# Patient Record
Sex: FEMALE | Race: WHITE | NOT HISPANIC OR LATINO | Employment: FULL TIME | URBAN - METROPOLITAN AREA
[De-identification: names, ages, dates, MRNs, and addresses within clinical notes are randomized per-mention and may not be internally consistent; named-entity substitution may affect disease eponyms.]

---

## 2021-06-11 ENCOUNTER — COMMUNICATION - HEALTHEAST (OUTPATIENT)
Dept: ADMINISTRATIVE | Facility: HOSPITAL | Age: 51
End: 2021-06-11

## 2021-06-11 ENCOUNTER — OFFICE VISIT - HEALTHEAST (OUTPATIENT)
Dept: FAMILY MEDICINE | Facility: CLINIC | Age: 51
End: 2021-06-11

## 2021-06-11 DIAGNOSIS — J45.30 MILD PERSISTENT ASTHMA WITHOUT COMPLICATION: ICD-10-CM

## 2021-06-11 DIAGNOSIS — Z79.899 MEDICATION MANAGEMENT: ICD-10-CM

## 2021-06-11 DIAGNOSIS — Z00.00 ROUTINE GENERAL MEDICAL EXAMINATION AT A HEALTH CARE FACILITY: ICD-10-CM

## 2021-06-11 DIAGNOSIS — Z12.31 VISIT FOR SCREENING MAMMOGRAM: ICD-10-CM

## 2021-06-11 DIAGNOSIS — Z12.11 SCREEN FOR COLON CANCER: ICD-10-CM

## 2021-06-11 DIAGNOSIS — R60.9 EDEMA, UNSPECIFIED TYPE: ICD-10-CM

## 2021-06-11 DIAGNOSIS — T75.3XXA MOTION SICKNESS, INITIAL ENCOUNTER: ICD-10-CM

## 2021-06-11 DIAGNOSIS — Z82.49 FAMILY HISTORY OF DVT: ICD-10-CM

## 2021-06-11 DIAGNOSIS — Z11.59 ENCOUNTER FOR HEPATITIS C SCREENING TEST FOR LOW RISK PATIENT: ICD-10-CM

## 2021-06-11 DIAGNOSIS — E87.6 HYPOKALEMIA: ICD-10-CM

## 2021-06-11 DIAGNOSIS — L21.9 SEBORRHEIC DERMATITIS OF SCALP: ICD-10-CM

## 2021-06-11 DIAGNOSIS — Z13.220 LIPID SCREENING: ICD-10-CM

## 2021-06-11 DIAGNOSIS — M17.11 PRIMARY OSTEOARTHRITIS OF RIGHT KNEE: ICD-10-CM

## 2021-06-11 DIAGNOSIS — L30.9 ECZEMA, UNSPECIFIED TYPE: ICD-10-CM

## 2021-06-11 LAB
ALBUMIN SERPL-MCNC: 4.5 G/DL (ref 3.5–5)
ALBUMIN UR-MCNC: NEGATIVE G/DL
ALP SERPL-CCNC: 93 U/L (ref 45–120)
ALT SERPL W P-5'-P-CCNC: 19 U/L (ref 0–45)
ANION GAP SERPL CALCULATED.3IONS-SCNC: 14 MMOL/L (ref 5–18)
APPEARANCE UR: CLEAR
AST SERPL W P-5'-P-CCNC: 31 U/L (ref 0–40)
BILIRUB SERPL-MCNC: 1.1 MG/DL (ref 0–1)
BILIRUB UR QL STRIP: NEGATIVE
BUN SERPL-MCNC: 17 MG/DL (ref 8–22)
CALCIUM SERPL-MCNC: 9.9 MG/DL (ref 8.5–10.5)
CHLORIDE BLD-SCNC: 97 MMOL/L (ref 98–107)
CHOLEST SERPL-MCNC: 222 MG/DL
CO2 SERPL-SCNC: 29 MMOL/L (ref 22–31)
COLOR UR AUTO: YELLOW
CREAT SERPL-MCNC: 0.77 MG/DL (ref 0.6–1.1)
ERYTHROCYTE [DISTWIDTH] IN BLOOD BY AUTOMATED COUNT: 12.4 % (ref 11–14.5)
FASTING STATUS PATIENT QL REPORTED: YES
GFR SERPL CREATININE-BSD FRML MDRD: >60 ML/MIN/1.73M2
GLUCOSE BLD-MCNC: 99 MG/DL (ref 70–125)
GLUCOSE UR STRIP-MCNC: NEGATIVE MG/DL
HCT VFR BLD AUTO: 42.7 % (ref 35–47)
HDLC SERPL-MCNC: 61 MG/DL
HGB BLD-MCNC: 14.9 G/DL (ref 12–16)
HGB UR QL STRIP: NEGATIVE
KETONES UR STRIP-MCNC: NEGATIVE MG/DL
LDLC SERPL CALC-MCNC: 145 MG/DL
LEUKOCYTE ESTERASE UR QL STRIP: NEGATIVE
MCH RBC QN AUTO: 30.3 PG (ref 27–34)
MCHC RBC AUTO-ENTMCNC: 34.9 G/DL (ref 32–36)
MCV RBC AUTO: 87 FL (ref 80–100)
NITRATE UR QL: NEGATIVE
PH UR STRIP: 7 [PH] (ref 5–8)
PLATELET # BLD AUTO: 374 THOU/UL (ref 140–440)
PMV BLD AUTO: 9 FL (ref 7–10)
POTASSIUM BLD-SCNC: 4 MMOL/L (ref 3.5–5)
PROT SERPL-MCNC: 7.8 G/DL (ref 6–8)
RBC # BLD AUTO: 4.92 MILL/UL (ref 3.8–5.4)
SODIUM SERPL-SCNC: 140 MMOL/L (ref 136–145)
SP GR UR STRIP: 1.02 (ref 1–1.03)
TRIGL SERPL-MCNC: 82 MG/DL
UROBILINOGEN UR STRIP-ACNC: NORMAL
WBC: 6 THOU/UL (ref 4–11)

## 2021-06-11 RX ORDER — ALBUTEROL SULFATE 90 UG/1
2 AEROSOL, METERED RESPIRATORY (INHALATION) EVERY 4 HOURS PRN
Qty: 1 INHALER | Refills: 2 | Status: SHIPPED | OUTPATIENT
Start: 2021-06-11 | End: 2021-08-27

## 2021-06-11 RX ORDER — KETOCONAZOLE 20 MG/ML
SHAMPOO TOPICAL
Status: SHIPPED | COMMUNITY
Start: 2020-02-13 | End: 2024-03-15

## 2021-06-11 RX ORDER — TRIAMCINOLONE ACETONIDE 1 MG/G
OINTMENT TOPICAL
Status: SHIPPED | COMMUNITY
Start: 2020-11-17 | End: 2021-08-26

## 2021-06-11 RX ORDER — BUDESONIDE AND FORMOTEROL FUMARATE DIHYDRATE 80; 4.5 UG/1; UG/1
2 AEROSOL RESPIRATORY (INHALATION) 2 TIMES DAILY
Qty: 1 INHALER | Refills: 11 | Status: SHIPPED | OUTPATIENT
Start: 2021-06-11 | End: 2021-08-27

## 2021-06-11 RX ORDER — CELECOXIB 200 MG/1
200 CAPSULE ORAL DAILY PRN
Qty: 90 CAPSULE | Refills: 2 | Status: SHIPPED | OUTPATIENT
Start: 2021-06-11 | End: 2022-12-13

## 2021-06-11 RX ORDER — FLUOCINOLONE ACETONIDE 0.11 MG/ML
OIL TOPICAL
Status: SHIPPED | COMMUNITY
Start: 2021-02-07 | End: 2024-03-15

## 2021-06-11 RX ORDER — NAPROXEN 500 MG/1
500 TABLET ORAL 2 TIMES DAILY PRN
Status: SHIPPED | COMMUNITY
Start: 2021-02-07 | End: 2023-05-03

## 2021-06-11 RX ORDER — CLOBETASOL PROPIONATE 0.05 G/100ML
SHAMPOO TOPICAL
Status: SHIPPED | COMMUNITY
Start: 2020-11-16 | End: 2023-01-01

## 2021-06-11 RX ORDER — FLUOCINOLONE ACETONIDE 0.1 MG/ML
SOLUTION TOPICAL
Status: SHIPPED | COMMUNITY
Start: 2020-02-13 | End: 2023-01-01

## 2021-06-11 RX ORDER — CHLORTHALIDONE 25 MG/1
25 TABLET ORAL DAILY
Qty: 90 TABLET | Refills: 2 | Status: SHIPPED | OUTPATIENT
Start: 2021-06-11 | End: 2022-01-20

## 2021-06-11 ASSESSMENT — MIFFLIN-ST. JEOR: SCORE: 1415.24

## 2021-06-16 PROBLEM — T75.3XXA MOTION SICKNESS, INITIAL ENCOUNTER: Status: ACTIVE | Noted: 2021-06-11

## 2021-06-16 PROBLEM — M17.11 PRIMARY OSTEOARTHRITIS OF RIGHT KNEE: Status: ACTIVE | Noted: 2021-06-11

## 2021-06-16 PROBLEM — L21.9 SEBORRHEIC DERMATITIS OF SCALP: Status: ACTIVE | Noted: 2021-06-11

## 2021-06-16 PROBLEM — E87.6 HYPOKALEMIA: Status: ACTIVE | Noted: 2021-06-11

## 2021-06-16 PROBLEM — J45.30 MILD PERSISTENT ASTHMA WITHOUT COMPLICATION: Status: ACTIVE | Noted: 2021-06-11

## 2021-06-16 PROBLEM — L30.9 ECZEMA, UNSPECIFIED TYPE: Status: ACTIVE | Noted: 2021-06-11

## 2021-06-16 PROBLEM — R60.9 EDEMA, UNSPECIFIED TYPE: Status: ACTIVE | Noted: 2021-06-11

## 2021-06-16 LAB — HCV AB SERPL QL IA: NEGATIVE

## 2021-06-21 ENCOUNTER — COMMUNICATION - HEALTHEAST (OUTPATIENT)
Dept: ADMINISTRATIVE | Facility: HOSPITAL | Age: 51
End: 2021-06-21

## 2021-06-26 NOTE — PROGRESS NOTES
Assessment and Plan:    1. Routine general medical examination at a health care facility  Recommend consuming a healthy diet and exercising.  She has had HIV screening.  She declines Pap smear today.  She is up-to-date on vaccinations.  - HM2(CBC w/o Differential)  - Urinalysis-UC if Indicated    2. Encounter for hepatitis C screening test for low risk patient  - Hepatitis C Antibody (Anti-HCV)    3. Visit for screening mammogram  - Mammo Screening Bilateral; Future    4. Screen for colon cancer  - Cologuard    5. Family history of DVT  Patient has a significant family history of blood clots.  Will refer to hematology for evaluation and recommendations.  - Ambulatory referral to Oncology/Hematology Adult    6. Lipid screening  - Lipid Cascade    7. Motion sickness, initial encounter  She continues scopolamine patches as needed.    8. Primary osteoarthritis of right knee  Patient continues Celebrex.  Discussed importance of taking this with food.  Will check renal function.  - celecoxib (CELEBREX) 200 MG capsule; Take 1 capsule (200 mg total) by mouth daily as needed.  Dispense: 90 capsule; Refill: 2    9. Seborrheic dermatitis of scalp  She continues fluocinolone, clobetasol, ketoconazole as needed.    10. Eczema, unspecified type  She continues triamcinolone cream as needed.    11. Mild persistent asthma without complication  Obtained ACT score of 21.  She continues Symbicort daily and albuterol as needed.  - budesonide-formoteroL (SYMBICORT) 80-4.5 mcg/actuation inhaler; Inhale 2 puffs 2 (two) times a day.  Dispense: 1 Inhaler; Refill: 11  - albuterol (VENTOLIN HFA) 90 mcg/actuation inhaler; Inhale 2 puffs every 4 (four) hours as needed for wheezing.  Dispense: 1 Inhaler; Refill: 2    12. Edema, unspecified type  This is controlled with chlorthalidone.  Will check renal function and electrolytes.  - chlorthalidone (HYGROTEN) 25 MG tablet; Take 1 tablet (25 mg total) by mouth daily.  Dispense: 90 tablet; Refill:  2    13. Hypokalemia  She has a history of hypokalemia.  Will check potassium.  - potassium chloride (KLOR-CON) 10 MEQ CR tablet; Take 1 tablet (10 mEq total) by mouth daily.  Dispense: 90 tablet; Refill: 2    14. Medication management  - Comprehensive Metabolic Panel      Subjective:     Neisha is a 50 y.o. female presenting to the clinic for a female physical.     LMP: 5-6 years ago  Hx of abnormal pap smear: 2017 low grade TERRIE 1   Last pap smear: 10/3/18 normal, negative HPV   Perform self-breast exams: yes   Vaginal discharge or irritation: none   Sexually active: yes,  for 27 years   Contraception: none   Concerns for STDs: none   Previous pregnancies:two pregnancies (vaginal deliveries)  Daughter 25 and Son 22     Patient seborrheic dermatitis and uses fluocinolone, clobetasol, and ketoconazole shampoos.  Patient has mild persistent asthma and uses Symbicort daily and albuterol as needed.  Exercise and allergy exposure exacerbate her asthma.  She has been using her Ventolin inhaler twice weekly.  Patient uses scopolamine patches as needed for motion sickness with travel and amusement park rides.  She uses chlorthalidone daily for lower extremity edema.  She has a history of hypokalemia and takes 10 mEq of potassium daily.  Patient has had numerous surgeries to her right knee and is now told that she has significant osteoarthritis.  She alternates Celebrex with naproxen.  She uses triamcinolone cream as needed for eczema patches on her skin.  Lastly, patient has a significant family history of blood clots.  Both her father and paternal grandfather taking anticoagulation.  She believes they have tested negative for clotting diseases.    Review of systems:  I performed a 10 point review of systems.  All pertinent positives and negatives are noted in the HPI. All others are negative.     Allergies   Allergen Reactions     Shellfish Derived Wheezing     All shellfish     Latex Rash       Current  Outpatient Medications on File Prior to Visit   Medication Sig Dispense Refill     clobetasoL 0.05 % shampoo USE ON SCALP OCCASIONALLY PRN.       fluocinolone (DERMA-SMOOTHE) 0.01 % external oil APPLY 5 ML EXTERNALLY TO THE AFFECTED AREA 1 TIME A WEEK       fluocinolone (SYNALAR) 0.01 % external solution Apply to scalp as directed       ketoconazole (NIZORAL) 2 % shampoo Apply topically.       naproxen (NAPROSYN) 500 MG tablet Take 500 mg by mouth 2 (two) times a day as needed.       scopolamine (TRANSDERM-SCOP) 1.5 mg transdermal patch Place 1 patch on the skin.       triamcinolone (KENALOG) 0.1 % ointment Apply topically.       [DISCONTINUED] budesonide-formoteroL (SYMBICORT) 80-4.5 mcg/actuation inhaler Inhale 2 puffs.       [DISCONTINUED] celecoxib (CELEBREX) 200 MG capsule Take 200 mg by mouth daily as needed.       [DISCONTINUED] chlorthalidone (HYGROTEN) 25 MG tablet Take 25 mg by mouth daily.       [DISCONTINUED] potassium chloride (KLOR-CON) 10 MEQ CR tablet Take 20 mEq by mouth daily.       [DISCONTINUED] VENTOLIN HFA 90 mcg/actuation inhaler 2 puffs every 6 (six) hours as needed.       No current facility-administered medications on file prior to visit.        Social History     Socioeconomic History     Marital status:      Spouse name: Not on file     Number of children: Not on file     Years of education: Not on file     Highest education level: Not on file   Occupational History     Not on file   Social Needs     Financial resource strain: Not on file     Food insecurity     Worry: Not on file     Inability: Not on file     Transportation needs     Medical: Not on file     Non-medical: Not on file   Tobacco Use     Smoking status: Never Smoker     Smokeless tobacco: Never Used   Substance and Sexual Activity     Alcohol use: Not Currently     Drug use: Never     Sexual activity: Not on file     Comment:     Lifestyle     Physical activity     Days per week: Not on file     Minutes per  session: Not on file     Stress: Not on file   Relationships     Social connections     Talks on phone: Not on file     Gets together: Not on file     Attends Tenriism service: Not on file     Active member of club or organization: Not on file     Attends meetings of clubs or organizations: Not on file     Relationship status: Not on file     Intimate partner violence     Fear of current or ex partner: Not on file     Emotionally abused: Not on file     Physically abused: Not on file     Forced sexual activity: Not on file   Other Topics Concern     Not on file   Social History Narrative     Not on file       Past Medical History:   Diagnosis Date     Asthma        Family History   Problem Relation Age of Onset     Osteopenia Mother      Hyperlipidemia Father      Deep vein thrombosis Father      Diabetes Father        Past Surgical History:   Procedure Laterality Date     KNEE ARTHROSCOPY Right      KNEE CARTILAGE SURGERY Right        Objective:     Vitals:    06/11/21 0905   BP: 116/80   Pulse: 73   SpO2: 98%       Patient is alert, no obvious distress.   Skin: Warm, dry.  No rashes or lesions. Skin turgor rapid return.   HEENT:  Eyes normal.  Ears normal.  Nose patent, mucosa pink.  Oropharynx mucosa pink, no lesions or tonsil enlargement.   Neck:  Supple, without lymphadenopathy, bruits, JVD. Thyroid normal texture and size.    Lungs:  Clear to auscultation.  No wheezing, rales noted.  Respirations even and unlabored.   Heart:  Regular rate and rhythm.  No murmurs.   Breasts:  Normal.  No surrounding adenopathy.   Abdomen: Soft, nontender.  No organomegaly.  Bowel sounds normoactive.  No guarding or masses noted.   :  Deferred per patient   Musculoskeletal:  Full ROM of extremities.    Neurological: No obvious gross or fine motor deficit is present.

## 2021-06-27 ENCOUNTER — HEALTH MAINTENANCE LETTER (OUTPATIENT)
Age: 51
End: 2021-06-27

## 2021-07-04 NOTE — LETTER
Letter by Tammy San CNP at      Author: Tammy San CNP Service: -- Author Type: --    Filed:  Encounter Date: 6/21/2021 Status: (Other)                   Office Hours: Monday - Friday 8:00 - 4:30PM    Neisha Morton  [address]  [facility] [address]           June 21, 2021      Dear Neisha:    Tammy San CNP had placed a referral for you to meet with hematology. Unfortunately we have been unable to reach you by phone. If you would like to schedule this please call [phone]         Sincerely,        [facility]

## 2021-07-06 VITALS
SYSTOLIC BLOOD PRESSURE: 116 MMHG | BODY MASS INDEX: 26.53 KG/M2 | HEIGHT: 67 IN | WEIGHT: 169 LBS | DIASTOLIC BLOOD PRESSURE: 80 MMHG | OXYGEN SATURATION: 98 % | HEART RATE: 73 BPM

## 2021-07-08 ASSESSMENT — ASTHMA QUESTIONNAIRES: ACT_TOTALSCORE: 21

## 2021-08-27 ENCOUNTER — OFFICE VISIT (OUTPATIENT)
Dept: FAMILY MEDICINE | Facility: CLINIC | Age: 51
End: 2021-08-27
Payer: COMMERCIAL

## 2021-08-27 VITALS
HEART RATE: 68 BPM | SYSTOLIC BLOOD PRESSURE: 102 MMHG | BODY MASS INDEX: 25.78 KG/M2 | OXYGEN SATURATION: 98 % | WEIGHT: 163.4 LBS | DIASTOLIC BLOOD PRESSURE: 58 MMHG

## 2021-08-27 DIAGNOSIS — H53.149 PHOTOPHOBIA: ICD-10-CM

## 2021-08-27 DIAGNOSIS — H57.89 RED EYE: Primary | ICD-10-CM

## 2021-08-27 PROCEDURE — 99213 OFFICE O/P EST LOW 20 MIN: CPT | Performed by: NURSE PRACTITIONER

## 2021-08-27 RX ORDER — POTASSIUM CHLORIDE 750 MG/1
10 TABLET, EXTENDED RELEASE ORAL
COMMUNITY
Start: 2021-06-11 | End: 2021-08-27

## 2021-08-27 RX ORDER — BUDESONIDE AND FORMOTEROL FUMARATE DIHYDRATE 80; 4.5 UG/1; UG/1
2 AEROSOL RESPIRATORY (INHALATION)
COMMUNITY
Start: 2020-02-13 | End: 2022-12-13

## 2021-08-27 RX ORDER — CELECOXIB 200 MG/1
200 CAPSULE ORAL
COMMUNITY
Start: 2020-02-13 | End: 2021-08-27

## 2021-08-27 RX ORDER — KETOCONAZOLE 20 MG/ML
SHAMPOO TOPICAL
COMMUNITY
Start: 2020-02-13 | End: 2021-08-27

## 2021-08-27 RX ORDER — CHLORTHALIDONE 25 MG/1
TABLET ORAL
COMMUNITY
Start: 2020-04-15 | End: 2021-08-27

## 2021-08-27 RX ORDER — TRIAMCINOLONE ACETONIDE 1 MG/G
OINTMENT TOPICAL
COMMUNITY
Start: 2020-11-17 | End: 2021-08-27

## 2021-08-27 RX ORDER — EPINEPHRINE 0.3 MG/.3ML
INJECTION SUBCUTANEOUS
COMMUNITY
Start: 2021-06-03 | End: 2022-04-28

## 2021-08-27 RX ORDER — ALBUTEROL SULFATE 90 UG/1
2 AEROSOL, METERED RESPIRATORY (INHALATION)
COMMUNITY
Start: 2020-08-28 | End: 2021-12-25

## 2021-08-27 RX ORDER — FLUOCINOLONE ACETONIDE 0.11 MG/ML
5 OIL TOPICAL
COMMUNITY
Start: 2020-02-13 | End: 2022-12-13

## 2021-08-27 RX ORDER — SCOLOPAMINE TRANSDERMAL SYSTEM 1 MG/1
1 PATCH, EXTENDED RELEASE TRANSDERMAL
COMMUNITY
Start: 2020-02-13 | End: 2021-08-27

## 2021-08-27 RX ORDER — NAPROXEN 500 MG/1
500 TABLET ORAL
COMMUNITY
Start: 2020-12-03 | End: 2021-08-27

## 2021-08-27 RX ORDER — FLUOCINOLONE ACETONIDE 0.1 MG/ML
SOLUTION TOPICAL
COMMUNITY
Start: 2020-02-13 | End: 2021-08-27

## 2021-08-27 NOTE — PROGRESS NOTES
Assessment and Plan:       ICD-10-CM    1. Red eye  H57.89 Adult Eye Referral   2. Photophobia  H53.149 Adult Eye Referral     Patient has a red eye and is experiencing photophobia and discomfort. Concerns include iritis, uveitis, glaucoma. She continues lubricating eyedrops. Discussed over-the-counter acetaminophen as needed for pain. Will refer to ophthalmology for a visit today.  She is content with the plan.     Subjective:     Neisha is a 50 year old female presenting to the clinic for concerns for right eye pain. Patient states Wednesday, she developed a burning sensation within her right eye. Since then, she has been experiencing photophobia. The eye is sensitive to a light breeze. She denies foreign body sensation and decreased visual acuity. She has tried lubricating eyedrops with no relief. She denies any trauma to the eye. She does wear contact lens. She has not had any recent cold symptoms or fever. She denies recent Covid exposure.    Reviewof Systems: A complete 14 point review of systems was obtained and is negative or as stated in the history of present illness.    Social History     Socioeconomic History     Marital status:      Spouse name: Not on file     Number of children: Not on file     Years of education: Not on file     Highest education level: Not on file   Occupational History     Not on file   Tobacco Use     Smoking status: Never Smoker     Smokeless tobacco: Never Used   Substance and Sexual Activity     Alcohol use: Not Currently     Drug use: Never     Sexual activity: Not on file     Comment:     Other Topics Concern     Not on file   Social History Narrative     Not on file     Social Determinants of Health     Financial Resource Strain:      Difficulty of Paying Living Expenses:    Food Insecurity:      Worried About Running Out of Food in the Last Year:      Ran Out of Food in the Last Year:    Transportation Needs:      Lack of Transportation (Medical):      Lack  of Transportation (Non-Medical):    Physical Activity:      Days of Exercise per Week:      Minutes of Exercise per Session:    Stress:      Feeling of Stress :    Social Connections:      Frequency of Communication with Friends and Family:      Frequency of Social Gatherings with Friends and Family:      Attends Alevism Services:      Active Member of Clubs or Organizations:      Attends Club or Organization Meetings:      Marital Status:    Intimate Partner Violence:      Fear of Current or Ex-Partner:      Emotionally Abused:      Physically Abused:      Sexually Abused:        Active Ambulatory Problems     Diagnosis Date Noted     Primary osteoarthritis of right knee 06/11/2021     Motion sickness, initial encounter 06/11/2021     Seborrheic dermatitis of scalp 06/11/2021     Eczema, unspecified type 06/11/2021     Mild persistent asthma without complication 06/11/2021     Edema, unspecified type 06/11/2021     Hypokalemia 06/11/2021     Resolved Ambulatory Problems     Diagnosis Date Noted     No Resolved Ambulatory Problems     Past Medical History:   Diagnosis Date     Asthma        Family History   Problem Relation Age of Onset     Osteopenia Mother      Hyperlipidemia Father      Deep Vein Thrombosis Father      Diabetes Father        Objective:     /58 (BP Location: Left arm, Patient Position: Sitting, Cuff Size: Adult Regular)   Pulse 68   Wt 74.1 kg (163 lb 6.4 oz)   SpO2 98%   BMI 25.78 kg/m      Patient is alert, in no obvious distress.   Skin: Warm, dry.  No lesions or rashes.  Skin turgor rapid return.   HEENT:  Head normocephalic, atraumatic.  Right eye conjunctiva is injected.  Left eye is normal.  PERRL.  EOM's intact.  No nystagmus. She complains of photophobia and does not tolerate the exam well. Ears normal.  Nose patent, mucosa pink.  Oropharynx mucosa pink.  No lesions or tonsillar enlargement.   Neck: Supple, no lymphadenopathy.   Lungs:  Clear to auscultation. Respirations even  and unlabored.  No wheezing or rales noted.   Heart:  Regular rate and rhythm.  No murmurs.

## 2021-10-17 ENCOUNTER — HEALTH MAINTENANCE LETTER (OUTPATIENT)
Age: 51
End: 2021-10-17

## 2021-12-22 DIAGNOSIS — J45.30 MILD PERSISTENT ASTHMA WITHOUT COMPLICATION: Primary | ICD-10-CM

## 2021-12-25 RX ORDER — ALBUTEROL SULFATE 90 UG/1
AEROSOL, METERED RESPIRATORY (INHALATION)
Qty: 17 G | Refills: 1 | Status: SHIPPED | OUTPATIENT
Start: 2021-12-25 | End: 2022-05-20

## 2021-12-25 NOTE — TELEPHONE ENCOUNTER
"Routing refill request to provider for review/approval because:  Failed asthma protocol    Last Written Prescription Date:  6/11/2021  Last Fill Quantity: 1 inhaler,  # refills: 2   Last office visit provider:  8/27/2021 Tammy San CNP       albuterol (VENTOLIN HFA) 90 mcg/actuation inhaler 1 Inhaler 2 6/11/2021  No   Sig - Route: Inhale 2 puffs every 4 (four) hours as needed for wheezing. - Inhalation   Sent to pharmacy as: albuterol sulfate HFA 90 mcg/actuation aerosol inhaler (Ventolin HFA)   Notes to Pharmacy: May substitute the equivalent medication per insurance preference.   E-Prescribing Status: Receipt confirmed by pharmacy (6/11/2021  9:53 AM CDT         Requested Prescriptions   Pending Prescriptions Disp Refills     albuterol (PROAIR HFA/PROVENTIL HFA/VENTOLIN HFA) 108 (90 Base) MCG/ACT inhaler [Pharmacy Med Name: ALBUTEROL HFA INH (200 PUFFS)8.5GM] 17 g      Sig: INHALE 2 PUFFS BY MOUTH EVERY 4 HOURS AS NEEDED FOR WHEEZING       Asthma Maintenance Inhalers - Anticholinergics Failed - 12/22/2021 12:33 PM        Failed - Asthma control assessment score within normal limits in last 6 months     Please review ACT score.           Passed - Patient is age 12 years or older        Passed - Medication is active on med list        Passed - Recent (6 mo) or future (30 days) visit within the authorizing provider's specialty     Patient had office visit in the last 6 months or has a visit in the next 30 days with authorizing provider or within the authorizing provider's specialty.  See \"Patient Info\" tab in inbasket, or \"Choose Columns\" in Meds & Orders section of the refill encounter.           Short-Acting Beta Agonist Inhalers Protocol  Failed - 12/22/2021 12:33 PM        Failed - Asthma control assessment score within normal limits in last 6 months     Please review ACT score.           Passed - Patient is age 12 or older        Passed - Medication is active on med list        Passed - Recent (6 mo) or future " "(30 days) visit within the authorizing provider's specialty     Patient had office visit in the last 6 months or has a visit in the next 30 days with authorizing provider or within the authorizing provider's specialty.  See \"Patient Info\" tab in inbasket, or \"Choose Columns\" in Meds & Orders section of the refill encounter.                 Dalia Angela RN 12/24/21 11:19 PM  "

## 2021-12-26 DIAGNOSIS — J45.30 MILD PERSISTENT ASTHMA WITHOUT COMPLICATION: ICD-10-CM

## 2021-12-28 RX ORDER — ALBUTEROL SULFATE 90 UG/1
AEROSOL, METERED RESPIRATORY (INHALATION)
Qty: 42.5 G | OUTPATIENT
Start: 2021-12-28

## 2022-01-15 ENCOUNTER — OFFICE VISIT (OUTPATIENT)
Dept: URGENT CARE | Facility: URGENT CARE | Age: 52
End: 2022-01-15
Payer: COMMERCIAL

## 2022-01-15 VITALS
WEIGHT: 163 LBS | TEMPERATURE: 98.1 F | BODY MASS INDEX: 25.72 KG/M2 | HEART RATE: 89 BPM | DIASTOLIC BLOOD PRESSURE: 75 MMHG | SYSTOLIC BLOOD PRESSURE: 113 MMHG | OXYGEN SATURATION: 99 %

## 2022-01-15 DIAGNOSIS — J01.10 ACUTE NON-RECURRENT FRONTAL SINUSITIS: Primary | ICD-10-CM

## 2022-01-15 PROCEDURE — 99213 OFFICE O/P EST LOW 20 MIN: CPT | Performed by: PHYSICIAN ASSISTANT

## 2022-01-15 NOTE — PROGRESS NOTES
Assessment & Plan     1. Acute non-recurrent frontal sinusitis    - amoxicillin-clavulanate (AUGMENTIN) 875-125 MG tablet; Take 1 tablet by mouth 2 times daily for 7 days  Dispense: 14 tablet; Refill: 0    Decongestants, steroid nasal spray, and augmentin. Follow up with PCP clinic if not improving over the next week.                 [facility] [place]    Sanju Driver is a 51 year old female who presents to clinic today for the following health issues:  Chief Complaint   Patient presents with     Urgent Care     Sinus Problem     c/o sinus infection for 5 days     HPI    2 weeks of infection including cough. Then over 5 days has now had frontal pain on forehead. No fever. Minimal nasal congestion. Some fatigue with dad passing a few days ago. Flonase, decongestants and mucinex without any change in pain or symptoms. COVID negative initially.         Review of Systems        Objective    /75   Pulse 89   Temp 98.1  F (36.7  C) (Tympanic)   Wt 73.9 kg (163 lb)   SpO2 99%   BMI 25.72 kg/m    Physical Exam  Vitals and nursing note reviewed.   Constitutional:       General: She is not in acute distress.     Appearance: She is well-developed. She is not diaphoretic.   HENT:      Head: Normocephalic and atraumatic.        Right Ear: Tympanic membrane and external ear normal.      Left Ear: Tympanic membrane and external ear normal.   Eyes:      Pupils: Pupils are equal, round, and reactive to light.   Pulmonary:      Effort: Pulmonary effort is normal. No respiratory distress.      Breath sounds: Normal breath sounds.   Musculoskeletal:      Cervical back: Normal range of motion and neck supple.   Lymphadenopathy:      Cervical: No cervical adenopathy.   Skin:     General: Skin is warm and dry.   Neurological:      Mental Status: She is alert.      Cranial Nerves: No cranial nerve deficit.

## 2022-01-18 DIAGNOSIS — R60.9 EDEMA, UNSPECIFIED TYPE: ICD-10-CM

## 2022-01-20 RX ORDER — CHLORTHALIDONE 25 MG/1
TABLET ORAL
Qty: 90 TABLET | Refills: 2 | Status: SHIPPED | OUTPATIENT
Start: 2022-01-20 | End: 2022-08-07

## 2022-01-20 NOTE — TELEPHONE ENCOUNTER
"Routing refill request to provider for review/approval because:  Early refill requested.    Last Written Prescription Date:  6/11/21  Last Fill Quantity: 90,  # refills: 2   Last office visit provider:  8/27/21     Requested Prescriptions   Pending Prescriptions Disp Refills     chlorthalidone (HYGROTON) 25 MG tablet [Pharmacy Med Name: CHLORTHALIDONE 25MG TABLETS] 90 tablet 2     Sig: TAKE 1 TABLET(25 MG) BY MOUTH DAILY       Diuretics (Including Combos) Protocol Passed - 1/18/2022  1:55 PM        Passed - Blood pressure under 140/90 in past 12 months     BP Readings from Last 3 Encounters:   01/15/22 113/75   08/27/21 102/58   06/11/21 116/80                 Passed - Recent (12 mo) or future (30 days) visit within the authorizing provider's specialty     Patient has had an office visit with the authorizing provider or a provider within the authorizing providers department within the previous 12 mos or has a future within next 30 days. See \"Patient Info\" tab in inbasket, or \"Choose Columns\" in Meds & Orders section of the refill encounter.              Passed - Medication is active on med list        Passed - Patient is age 18 or older        Passed - No active pregancy on record        Passed - Normal serum creatinine on file in past 12 months     Recent Labs   Lab Test 06/11/21  1014   CR 0.77              Passed - Normal serum potassium on file in past 12 months     Recent Labs   Lab Test 06/11/21  1014   POTASSIUM 4.0                    Passed - Normal serum sodium on file in past 12 months     Recent Labs   Lab Test 06/11/21  1014                 Passed - No positive pregnancy test in past 12 months             Jerod Norton RN 01/20/22 1:48 PM  "

## 2022-02-24 DIAGNOSIS — L30.9 ECZEMA, UNSPECIFIED TYPE: ICD-10-CM

## 2022-02-26 NOTE — TELEPHONE ENCOUNTER
"Routing refill request to provider for review/approval because:  PCP to review    Last Written Prescription Date:  8/26/2021  Last Fill Quantity: 80 g,  # refills: 0   Last office visit provider:  8/27/2021 Tammy San CNP     Requested Prescriptions   Pending Prescriptions Disp Refills     triamcinolone (KENALOG) 0.1 % external ointment [Pharmacy Med Name: TRIAMCINOLONE 0.1% OINTMENT 80GM] 80 g 0     Sig: APPLY TOPICALLY TO THE AFFECTED AREA TWICE DAILY       Topical Steroids and Nonsteroidals Protocol Passed - 2/24/2022  4:58 PM        Passed - Patient is age 6 or older        Passed - Authorizing prescriber's most recent note related to this medication read.     If refill request is for ophthalmic use, please forward request to provider for approval.          Passed - High potency steroid not ordered        Passed - Recent (12 mo) or future (30 days) visit within the authorizing provider's specialty     Patient has had an office visit with the authorizing provider or a provider within the authorizing providers department within the previous 12 mos or has a future within next 30 days. See \"Patient Info\" tab in inbasket, or \"Choose Columns\" in Meds & Orders section of the refill encounter.              Passed - Medication is active on med list             Dalia Angela RN 02/26/22 3:28 PM  "

## 2022-02-27 RX ORDER — TRIAMCINOLONE ACETONIDE 1 MG/G
OINTMENT TOPICAL
Qty: 80 G | Refills: 0 | Status: SHIPPED | OUTPATIENT
Start: 2022-02-27 | End: 2022-12-21

## 2022-03-02 ENCOUNTER — ANCILLARY PROCEDURE (OUTPATIENT)
Dept: MAMMOGRAPHY | Facility: CLINIC | Age: 52
End: 2022-03-02
Attending: NURSE PRACTITIONER
Payer: COMMERCIAL

## 2022-03-02 DIAGNOSIS — Z12.31 VISIT FOR SCREENING MAMMOGRAM: ICD-10-CM

## 2022-03-02 PROCEDURE — 77067 SCR MAMMO BI INCL CAD: CPT

## 2022-05-16 DIAGNOSIS — J45.30 MILD PERSISTENT ASTHMA WITHOUT COMPLICATION: ICD-10-CM

## 2022-05-17 NOTE — TELEPHONE ENCOUNTER
"Routing refill request to provider for review/approval because:  act    Last Written Prescription Date:  12/25/21  Last Fill Quantity: 17,  # refills: 1   Last office visit provider:  8/27/21     Requested Prescriptions   Pending Prescriptions Disp Refills     albuterol (PROAIR HFA/PROVENTIL HFA/VENTOLIN HFA) 108 (90 Base) MCG/ACT inhaler [Pharmacy Med Name: ALBUTEROL HFA INH (200 PUFFS)8.5GM] 42.5 g      Sig: INHALE 2 PUFFS BY MOUTH EVERY 4 HOURS AS NEEDED FOR WHEEZING       Asthma Maintenance Inhalers - Anticholinergics Failed - 5/16/2022 11:17 AM        Failed - Asthma control assessment score within normal limits in last 6 months     Please review ACT score.           Failed - Recent (6 mo) or future (30 days) visit within the authorizing provider's specialty     Patient had office visit in the last 6 months or has a visit in the next 30 days with authorizing provider or within the authorizing provider's specialty.  See \"Patient Info\" tab in inbasket, or \"Choose Columns\" in Meds & Orders section of the refill encounter.            Passed - Patient is age 12 years or older        Passed - Medication is active on med list       Short-Acting Beta Agonist Inhalers Protocol  Failed - 5/16/2022 11:17 AM        Failed - Asthma control assessment score within normal limits in last 6 months     Please review ACT score.           Failed - Recent (6 mo) or future (30 days) visit within the authorizing provider's specialty     Patient had office visit in the last 6 months or has a visit in the next 30 days with authorizing provider or within the authorizing provider's specialty.  See \"Patient Info\" tab in inbasket, or \"Choose Columns\" in Meds & Orders section of the refill encounter.            Passed - Patient is age 12 or older        Passed - Medication is active on med list             Mercer, Renée, RN 05/17/22 4:40 PM  "

## 2022-05-20 RX ORDER — ALBUTEROL SULFATE 90 UG/1
AEROSOL, METERED RESPIRATORY (INHALATION)
Qty: 42.5 G | Refills: 0 | Status: SHIPPED | OUTPATIENT
Start: 2022-05-20 | End: 2022-12-13

## 2022-07-24 ENCOUNTER — HEALTH MAINTENANCE LETTER (OUTPATIENT)
Age: 52
End: 2022-07-24

## 2022-08-01 DIAGNOSIS — R60.9 EDEMA, UNSPECIFIED TYPE: ICD-10-CM

## 2022-08-01 RX ORDER — CHLORTHALIDONE 25 MG/1
TABLET ORAL
Qty: 90 TABLET | Refills: 2 | Status: CANCELLED | OUTPATIENT
Start: 2022-08-01

## 2022-08-02 NOTE — TELEPHONE ENCOUNTER
"Routing refill request to provider for review/approval because:  Patient needs to be seen because it has been more than 1 year since last office visit.    Last Written Prescription Date:  1/20/22   Last Fill Quantity: 90,  # refills: 2   Last office visit provider:  8/27/21     Requested Prescriptions   Pending Prescriptions Disp Refills     chlorthalidone (HYGROTON) 25 MG tablet [Pharmacy Med Name: CHLORTHALIDONE 25MG TABLETS] 90 tablet 2     Sig: TAKE 1 TABLET(25 MG) BY MOUTH DAILY       Diuretics (Including Combos) Protocol Failed - 8/1/2022  4:52 PM        Failed - Normal serum creatinine on file in past 12 months     Recent Labs   Lab Test 06/11/21  1014   CR 0.77              Failed - Normal serum potassium on file in past 12 months     Recent Labs   Lab Test 06/11/21  1014   POTASSIUM 4.0                    Failed - Normal serum sodium on file in past 12 months     Recent Labs   Lab Test 06/11/21  1014                 Passed - Blood pressure under 140/90 in past 12 months     BP Readings from Last 3 Encounters:   01/15/22 113/75   08/27/21 102/58   06/11/21 116/80                 Passed - Recent (12 mo) or future (30 days) visit within the authorizing provider's specialty     Patient has had an office visit with the authorizing provider or a provider within the authorizing providers department within the previous 12 mos or has a future within next 30 days. See \"Patient Info\" tab in inbasket, or \"Choose Columns\" in Meds & Orders section of the refill encounter.              Passed - Medication is active on med list        Passed - Patient is age 18 or older        Passed - No active pregancy on record        Passed - No positive pregnancy test in past 12 months             Renée Hernandez RN 08/02/22 1:54 PM  "

## 2022-08-07 RX ORDER — CHLORTHALIDONE 25 MG/1
TABLET ORAL
Qty: 30 TABLET | Refills: 0 | Status: SHIPPED | OUTPATIENT
Start: 2022-08-07 | End: 2022-10-19

## 2022-10-03 ENCOUNTER — HEALTH MAINTENANCE LETTER (OUTPATIENT)
Age: 52
End: 2022-10-03

## 2022-10-18 DIAGNOSIS — R60.9 EDEMA, UNSPECIFIED TYPE: ICD-10-CM

## 2022-10-19 RX ORDER — CHLORTHALIDONE 25 MG/1
25 TABLET ORAL DAILY
Qty: 90 TABLET | Refills: 0 | Status: SHIPPED | OUTPATIENT
Start: 2022-10-19 | End: 2022-12-13

## 2022-10-19 NOTE — TELEPHONE ENCOUNTER
"Routing refill request to provider for review/approval because:  Labs not current:  Multiple  Patient needs to be seen because it has been more than 1 year since last office visit.    Last Written Prescription Date:  8/7/22  Last Fill Quantity: 30,  # refills: 0   Last office visit provider:  8/27/21 - scheduled 11/1/22     Requested Prescriptions   Pending Prescriptions Disp Refills     chlorthalidone (HYGROTON) 25 MG tablet [Pharmacy Med Name: CHLORTHALIDONE 25MG TABLETS] 90 tablet      Sig: TAKE 1 TABLET(25 MG) BY MOUTH DAILY       Diuretics (Including Combos) Protocol Failed - 10/19/2022  8:52 AM        Failed - Normal serum creatinine on file in past 12 months     Recent Labs   Lab Test 06/11/21  1014   CR 0.77              Failed - Normal serum potassium on file in past 12 months     Recent Labs   Lab Test 06/11/21  1014   POTASSIUM 4.0                    Failed - Normal serum sodium on file in past 12 months     Recent Labs   Lab Test 06/11/21  1014                 Passed - Blood pressure under 140/90 in past 12 months     BP Readings from Last 3 Encounters:   01/15/22 113/75   08/27/21 102/58   06/11/21 116/80                 Passed - Recent (12 mo) or future (30 days) visit within the authorizing provider's specialty     Patient has had an office visit with the authorizing provider or a provider within the authorizing providers department within the previous 12 mos or has a future within next 30 days. See \"Patient Info\" tab in inbasket, or \"Choose Columns\" in Meds & Orders section of the refill encounter.              Passed - Medication is active on med list        Passed - Patient is age 18 or older        Passed - No active pregancy on record        Passed - No positive pregnancy test in past 12 months             Jerod Norton RN 10/19/22 8:52 AM  "

## 2022-12-13 ENCOUNTER — OFFICE VISIT (OUTPATIENT)
Dept: FAMILY MEDICINE | Facility: CLINIC | Age: 52
End: 2022-12-13
Payer: COMMERCIAL

## 2022-12-13 VITALS
OXYGEN SATURATION: 98 % | TEMPERATURE: 98.1 F | SYSTOLIC BLOOD PRESSURE: 98 MMHG | HEIGHT: 67 IN | BODY MASS INDEX: 28.25 KG/M2 | DIASTOLIC BLOOD PRESSURE: 82 MMHG | HEART RATE: 74 BPM | WEIGHT: 180 LBS

## 2022-12-13 DIAGNOSIS — Z00.00 ENCOUNTER FOR ROUTINE HISTORY AND PHYSICAL EXAM IN FEMALE: Primary | ICD-10-CM

## 2022-12-13 DIAGNOSIS — R60.9 EDEMA, UNSPECIFIED TYPE: ICD-10-CM

## 2022-12-13 DIAGNOSIS — M17.11 PRIMARY OSTEOARTHRITIS OF RIGHT KNEE: ICD-10-CM

## 2022-12-13 DIAGNOSIS — J45.30 MILD PERSISTENT ASTHMA WITHOUT COMPLICATION: ICD-10-CM

## 2022-12-13 DIAGNOSIS — Z12.11 SCREEN FOR COLON CANCER: ICD-10-CM

## 2022-12-13 DIAGNOSIS — Z13.1 DIABETES MELLITUS SCREENING: ICD-10-CM

## 2022-12-13 DIAGNOSIS — Z12.4 CERVICAL CANCER SCREENING: ICD-10-CM

## 2022-12-13 DIAGNOSIS — Z13.220 LIPID SCREENING: ICD-10-CM

## 2022-12-13 DIAGNOSIS — Z79.899 MEDICATION MANAGEMENT: ICD-10-CM

## 2022-12-13 LAB
ALBUMIN SERPL BCG-MCNC: 4.7 G/DL (ref 3.5–5.2)
ALBUMIN UR-MCNC: NEGATIVE MG/DL
ALP SERPL-CCNC: 94 U/L (ref 35–104)
ALT SERPL W P-5'-P-CCNC: 21 U/L (ref 10–35)
ANION GAP SERPL CALCULATED.3IONS-SCNC: 16 MMOL/L (ref 7–15)
APPEARANCE UR: CLEAR
AST SERPL W P-5'-P-CCNC: 32 U/L (ref 10–35)
BILIRUB SERPL-MCNC: 0.6 MG/DL
BILIRUB UR QL STRIP: NEGATIVE
BUN SERPL-MCNC: 18.5 MG/DL (ref 6–20)
CALCIUM SERPL-MCNC: 9.7 MG/DL (ref 8.6–10)
CHLORIDE SERPL-SCNC: 96 MMOL/L (ref 98–107)
CHOLEST SERPL-MCNC: 226 MG/DL
COLOR UR AUTO: YELLOW
CREAT SERPL-MCNC: 0.69 MG/DL (ref 0.51–0.95)
DEPRECATED HCO3 PLAS-SCNC: 27 MMOL/L (ref 22–29)
ERYTHROCYTE [DISTWIDTH] IN BLOOD BY AUTOMATED COUNT: 12.1 % (ref 10–15)
GFR SERPL CREATININE-BSD FRML MDRD: >90 ML/MIN/1.73M2
GLUCOSE SERPL-MCNC: 105 MG/DL (ref 70–99)
GLUCOSE UR STRIP-MCNC: NEGATIVE MG/DL
HBA1C MFR BLD: 5.9 % (ref 0–5.6)
HCT VFR BLD AUTO: 43.2 % (ref 35–47)
HDLC SERPL-MCNC: 71 MG/DL
HGB BLD-MCNC: 15.3 G/DL (ref 11.7–15.7)
HGB UR QL STRIP: NEGATIVE
KETONES UR STRIP-MCNC: NEGATIVE MG/DL
LDLC SERPL CALC-MCNC: 137 MG/DL
LEUKOCYTE ESTERASE UR QL STRIP: NEGATIVE
MCH RBC QN AUTO: 30.7 PG (ref 26.5–33)
MCHC RBC AUTO-ENTMCNC: 35.4 G/DL (ref 31.5–36.5)
MCV RBC AUTO: 87 FL (ref 78–100)
NITRATE UR QL: NEGATIVE
NONHDLC SERPL-MCNC: 155 MG/DL
PH UR STRIP: 7 [PH] (ref 5–8)
PLATELET # BLD AUTO: 365 10E3/UL (ref 150–450)
POTASSIUM SERPL-SCNC: 3.4 MMOL/L (ref 3.4–5.3)
PROT SERPL-MCNC: 7.9 G/DL (ref 6.4–8.3)
RBC # BLD AUTO: 4.99 10E6/UL (ref 3.8–5.2)
SODIUM SERPL-SCNC: 139 MMOL/L (ref 136–145)
SP GR UR STRIP: >=1.03 (ref 1–1.03)
TRIGL SERPL-MCNC: 88 MG/DL
TSH SERPL DL<=0.005 MIU/L-ACNC: 1.38 UIU/ML (ref 0.3–4.2)
UROBILINOGEN UR STRIP-ACNC: 1 E.U./DL
WBC # BLD AUTO: 4.5 10E3/UL (ref 4–11)

## 2022-12-13 PROCEDURE — 90471 IMMUNIZATION ADMIN: CPT | Performed by: NURSE PRACTITIONER

## 2022-12-13 PROCEDURE — 80053 COMPREHEN METABOLIC PANEL: CPT | Performed by: NURSE PRACTITIONER

## 2022-12-13 PROCEDURE — 87624 HPV HI-RISK TYP POOLED RSLT: CPT | Performed by: NURSE PRACTITIONER

## 2022-12-13 PROCEDURE — 81003 URINALYSIS AUTO W/O SCOPE: CPT | Performed by: NURSE PRACTITIONER

## 2022-12-13 PROCEDURE — G0145 SCR C/V CYTO,THINLAYER,RESCR: HCPCS | Performed by: NURSE PRACTITIONER

## 2022-12-13 PROCEDURE — 84443 ASSAY THYROID STIM HORMONE: CPT | Performed by: NURSE PRACTITIONER

## 2022-12-13 PROCEDURE — 83036 HEMOGLOBIN GLYCOSYLATED A1C: CPT | Performed by: NURSE PRACTITIONER

## 2022-12-13 PROCEDURE — 90677 PCV20 VACCINE IM: CPT | Performed by: NURSE PRACTITIONER

## 2022-12-13 PROCEDURE — 85027 COMPLETE CBC AUTOMATED: CPT | Performed by: NURSE PRACTITIONER

## 2022-12-13 PROCEDURE — 99214 OFFICE O/P EST MOD 30 MIN: CPT | Mod: 25 | Performed by: NURSE PRACTITIONER

## 2022-12-13 PROCEDURE — 36415 COLL VENOUS BLD VENIPUNCTURE: CPT | Performed by: NURSE PRACTITIONER

## 2022-12-13 PROCEDURE — 99396 PREV VISIT EST AGE 40-64: CPT | Mod: 25 | Performed by: NURSE PRACTITIONER

## 2022-12-13 PROCEDURE — 80061 LIPID PANEL: CPT | Performed by: NURSE PRACTITIONER

## 2022-12-13 RX ORDER — BUDESONIDE AND FORMOTEROL FUMARATE DIHYDRATE 80; 4.5 UG/1; UG/1
2 AEROSOL RESPIRATORY (INHALATION) 2 TIMES DAILY
Qty: 10.2 G | Refills: 11 | Status: SHIPPED | OUTPATIENT
Start: 2022-12-13 | End: 2023-12-20

## 2022-12-13 RX ORDER — ALBUTEROL SULFATE 90 UG/1
2 AEROSOL, METERED RESPIRATORY (INHALATION) EVERY 4 HOURS PRN
Qty: 18 G | Refills: 1 | Status: SHIPPED | OUTPATIENT
Start: 2022-12-13 | End: 2023-12-20

## 2022-12-13 RX ORDER — CELECOXIB 200 MG/1
200 CAPSULE ORAL DAILY PRN
Qty: 90 CAPSULE | Refills: 2 | Status: SHIPPED | OUTPATIENT
Start: 2022-12-13 | End: 2024-01-19

## 2022-12-13 RX ORDER — CHLORTHALIDONE 25 MG/1
25 TABLET ORAL DAILY
Qty: 90 TABLET | Refills: 3 | Status: SHIPPED | OUTPATIENT
Start: 2022-12-13 | End: 2023-11-13

## 2022-12-13 ASSESSMENT — ASTHMA QUESTIONNAIRES
ACT_TOTALSCORE: 22
QUESTION_2 LAST FOUR WEEKS HOW OFTEN HAVE YOU HAD SHORTNESS OF BREATH: ONCE OR TWICE A WEEK
QUESTION_3 LAST FOUR WEEKS HOW OFTEN DID YOUR ASTHMA SYMPTOMS (WHEEZING, COUGHING, SHORTNESS OF BREATH, CHEST TIGHTNESS OR PAIN) WAKE YOU UP AT NIGHT OR EARLIER THAN USUAL IN THE MORNING: NOT AT ALL
QUESTION_4 LAST FOUR WEEKS HOW OFTEN HAVE YOU USED YOUR RESCUE INHALER OR NEBULIZER MEDICATION (SUCH AS ALBUTEROL): ONCE A WEEK OR LESS
QUESTION_5 LAST FOUR WEEKS HOW WOULD YOU RATE YOUR ASTHMA CONTROL: WELL CONTROLLED
QUESTION_1 LAST FOUR WEEKS HOW MUCH OF THE TIME DID YOUR ASTHMA KEEP YOU FROM GETTING AS MUCH DONE AT WORK, SCHOOL OR AT HOME: NONE OF THE TIME
ACT_TOTALSCORE: 22

## 2022-12-13 ASSESSMENT — ENCOUNTER SYMPTOMS
NERVOUS/ANXIOUS: 0
JOINT SWELLING: 1
HEMATURIA: 0
WEAKNESS: 0
SORE THROAT: 0
PALPITATIONS: 0
SHORTNESS OF BREATH: 0
ABDOMINAL PAIN: 0
CONSTIPATION: 0
FEVER: 0
EYE PAIN: 0
MYALGIAS: 0
ARTHRALGIAS: 1
DYSURIA: 0
CHILLS: 0
PARESTHESIAS: 0
HEARTBURN: 0
COUGH: 0
NAUSEA: 0
HEMATOCHEZIA: 0
DIARRHEA: 0
HEADACHES: 1
DIZZINESS: 0
FREQUENCY: 0

## 2022-12-13 ASSESSMENT — PAIN SCALES - GENERAL: PAINLEVEL: NO PAIN (0)

## 2022-12-13 NOTE — PROGRESS NOTES
Assessment and Plan:    Encounter for routine history and physical exam in female  Recommend consuming a healthy diet and exercising.  She declines HIV screening.  She declines COVID booster.  Provided pneumococcal vaccine.  - CBC with platelets  - TSH with free T4 reflex  - UA Macro with Reflex to Micro and Culture - lab collect  - CBC with platelets  - TSH with free T4 reflex  - UA Macro with Reflex to Micro and Culture - lab collect    Diabetes mellitus screening  - Hemoglobin A1c  - Hemoglobin A1c    Lipid screening  - Lipid panel reflex to direct LDL Fasting  - Lipid panel reflex to direct LDL Fasting    Screen for colon cancer  - Colonoscopy Screening  Referral    Cervical cancer screening  - Pap Screen with HPV - recommended age 30 - 65 years    Mild persistent asthma without complication  This is controlled.  She continues Symbicort daily and albuterol as needed.  - budesonide-formoterol (SYMBICORT) 80-4.5 MCG/ACT Inhaler  Dispense: 10.2 g; Refill: 11  - albuterol (PROAIR HFA/PROVENTIL HFA/VENTOLIN HFA) 108 (90 Base) MCG/ACT inhaler  Dispense: 18 g; Refill: 1    Primary osteoarthritis of right knee  She continues Celebrex as needed.  Will monitor renal function.  - celecoxib (CELEBREX) 200 MG capsule  Dispense: 90 capsule; Refill: 2    Edema, unspecified type  She continues chlorthalidone.  This is controlled.  - chlorthalidone (HYGROTON) 25 MG tablet  Dispense: 90 tablet; Refill: 3    Medication management  - Comprehensive metabolic panel  - Comprehensive metabolic panel      Subjective:     Neisha is a 52 year old female presenting to the clinic for a female physical.       LMP: at age 45, no bleeding since   Hx of abnormal pap smear: 2017 low grade TERRIE 1   Last pap smear: 10/3/18 normal, negative HPV   Perform self-breast exams: yes   Vaginal discharge or irritation: none   Sexually active: yes,  for 29 years   Contraception: none   Concerns for STDs: none   Previous pregnancies:two  pregnancies (vaginal deliveries)  Daughter 27 and Son 24     Patient has a history of asthma which is well controlled.  She uses her albuterol inhaler 1-2 times per week.  Smoke exposure, exercise, cold weather exposure exacerbate her asthma.  Patient has a history of osteoarthritis and takes Celebrex dailiy.  She uses chlorthalidone for lower extremity swelling.  She has a history of hypokalemia and is not currently taking potassium supplementation.  Patient uses scopolamine patches as needed for motion sickness with travel.    Review of systems:  I performed a 10 point review of systems.  All pertinent positives and negatives are noted in the HPI. All others are negative.     Allergies   Allergen Reactions     Shellfish Derived [Shellfish-Derived Products] Difficulty breathing     All shellfish     Latex Rash       Current Outpatient Medications   Medication     albuterol (PROAIR HFA/PROVENTIL HFA/VENTOLIN HFA) 108 (90 Base) MCG/ACT inhaler     budesonide-formoterol (SYMBICORT) 80-4.5 MCG/ACT Inhaler     celecoxib (CELEBREX) 200 MG capsule     chlorthalidone (HYGROTON) 25 MG tablet     clobetasoL 0.05 % shampoo     EPINEPHrine (ANY BX GENERIC EQUIV) 0.3 MG/0.3ML injection 2-pack     fluocinolone (DERMA-SMOOTHE) 0.01 % external oil     fluocinolone (SYNALAR) 0.01 % external solution     ketoconazole (NIZORAL) 2 % shampoo     naproxen (NAPROSYN) 500 MG tablet     potassium chloride (KLOR-CON) 10 MEQ CR tablet     scopolamine (TRANSDERM-SCOP) 1.5 mg transdermal patch     triamcinolone (KENALOG) 0.1 % external ointment     No current facility-administered medications for this visit.       Social History     Socioeconomic History     Marital status:      Spouse name: Not on file     Number of children: Not on file     Years of education: Not on file     Highest education level: Not on file   Occupational History     Not on file   Tobacco Use     Smoking status: Never     Smokeless tobacco: Never   Substance and  "Sexual Activity     Alcohol use: Not Currently     Drug use: Never     Sexual activity: Not on file     Comment:     Other Topics Concern     Not on file   Social History Narrative     Not on file     Social Determinants of Health     Financial Resource Strain: Not on file   Food Insecurity: Not on file   Transportation Needs: Not on file   Physical Activity: Not on file   Stress: Not on file   Social Connections: Not on file   Intimate Partner Violence: Not on file   Housing Stability: Not on file       Past Medical History:   Diagnosis Date     Asthma        Family History   Problem Relation Age of Onset     Osteopenia Mother      Hyperlipidemia Father      Deep Vein Thrombosis Father      Diabetes Father        Past Surgical History:   Procedure Laterality Date     ARTHROSCOPY KNEE Right      ARTHROSCOPY KNEE PROCEDURE CARTILAGE (GENZYME) Right        Objective:     BP 98/82 (BP Location: Right arm, Patient Position: Sitting, Cuff Size: Adult Regular)   Pulse 74   Temp 98.1  F (36.7  C) (Oral)   Ht 1.695 m (5' 6.73\")   Wt 81.6 kg (180 lb)   SpO2 98%   BMI 28.42 kg/m      Patient is alert, no obvious distress.   Skin: Warm, dry.  No rashes or lesions. Skin turgor rapid return.   HEENT:  Eyes normal.  Ears normal.  Nose patent, mucosa pink.  Oropharynx mucosa pink, no lesions or tonsil enlargement.   Neck:  Supple, without lymphadenopathy, bruits, JVD. Thyroid normal texture and size.    Lungs:  Clear to auscultation.  No wheezing, rales noted.  Respirations even and unlabored.   Heart:  Regular rate and rhythm.  No murmurs.   Breasts:  Normal.  No surrounding adenopathy.   Abdomen: Soft, nontender.  No organomegaly.  Bowel sounds normoactive.  No guarding or masses noted.   :  External genitalia normal.  Normal vaginal mucosa.  Cervix no lesions or cervical motion tenderness.   Musculoskeletal:  Full ROM of extremities.  Muscle strength equal +5/5.   Neurological:  Cranial nerves 2-12 intact.       "     Answers for HPI/ROS submitted by the patient on 12/13/2022  Frequency of exercise:: None  Getting at least 3 servings of Calcium per day:: Yes  Diet:: Regular (no restrictions)  Taking medications regularly:: Yes  Medication side effects:: None  Bi-annual eye exam:: Yes  Dental care twice a year:: NO  Sleep apnea or symptoms of sleep apnea:: None  abdominal pain: No  Blood in stool: No  Blood in urine: No  chest pain: No  chills: No  congestion: Yes  constipation: No  cough: No  diarrhea: No  dizziness: No  ear pain: No  eye pain: No  nervous/anxious: No  fever: No  frequency: No  genital sores: No  headaches: Yes  hearing loss: No  heartburn: No  arthralgias: Yes  joint swelling: Yes  peripheral edema: No  mood changes: No  myalgias: No  nausea: No  dysuria: No  palpitations: No  Skin sensation changes: No  sore throat: No  urgency: No  rash: No  shortness of breath: No  visual disturbance: No  weakness: No  Additional concerns today:: No

## 2022-12-16 LAB
BKR LAB AP GYN ADEQUACY: NORMAL
BKR LAB AP GYN INTERPRETATION: NORMAL
BKR LAB AP HPV REFLEX: NORMAL
BKR LAB AP PREVIOUS ABNORMAL: NORMAL
PATH REPORT.COMMENTS IMP SPEC: NORMAL
PATH REPORT.COMMENTS IMP SPEC: NORMAL
PATH REPORT.RELEVANT HX SPEC: NORMAL

## 2022-12-20 DIAGNOSIS — L30.9 ECZEMA, UNSPECIFIED TYPE: ICD-10-CM

## 2022-12-20 LAB
HUMAN PAPILLOMA VIRUS 16 DNA: NEGATIVE
HUMAN PAPILLOMA VIRUS 18 DNA: NEGATIVE
HUMAN PAPILLOMA VIRUS FINAL DIAGNOSIS: NORMAL
HUMAN PAPILLOMA VIRUS OTHER HR: NEGATIVE

## 2022-12-21 PROBLEM — R87.612 LGSIL ON PAP SMEAR OF CERVIX: Status: ACTIVE | Noted: 2017-06-15

## 2022-12-21 RX ORDER — TRIAMCINOLONE ACETONIDE 1 MG/G
OINTMENT TOPICAL
Qty: 80 G | Refills: 1 | Status: SHIPPED | OUTPATIENT
Start: 2022-12-21

## 2022-12-21 NOTE — TELEPHONE ENCOUNTER
"Last Written Prescription Date:  2/27/22  Last Fill Quantity: 80 g,  # refills: 0   Last office visit provider:  12/13/22     Requested Prescriptions   Pending Prescriptions Disp Refills     triamcinolone (KENALOG) 0.1 % external ointment [Pharmacy Med Name: TRIAMCINOLONE 0.1% OINTMENT 80GM] 80 g 0     Sig: APPLY TOPICALLY TO THE AFFECTED AREA TWICE DAILY       Topical Steroids and Nonsteroidals Protocol Passed - 12/21/2022  8:58 AM        Passed - Patient is age 6 or older        Passed - Authorizing prescriber's most recent note related to this medication read.     If refill request is for ophthalmic use, please forward request to provider for approval.          Passed - High potency steroid not ordered        Passed - Recent (12 mo) or future (30 days) visit within the authorizing provider's specialty     Patient has had an office visit with the authorizing provider or a provider within the authorizing providers department within the previous 12 mos or has a future within next 30 days. See \"Patient Info\" tab in inbasket, or \"Choose Columns\" in Meds & Orders section of the refill encounter.              Passed - Medication is active on med list             Jerod Norton RN 12/21/22 8:59 AM  "

## 2023-01-01 DIAGNOSIS — L21.9 SEBORRHEIC DERMATITIS OF SCALP: Primary | ICD-10-CM

## 2023-01-01 RX ORDER — CLOBETASOL PROPIONATE 0.05 G/100ML
SHAMPOO TOPICAL
Qty: 118 ML | Refills: 1 | Status: SHIPPED | OUTPATIENT
Start: 2023-01-01

## 2023-01-01 RX ORDER — FLUOCINOLONE ACETONIDE 0.1 MG/ML
SOLUTION TOPICAL
Qty: 90 ML | Refills: 3 | Status: SHIPPED | OUTPATIENT
Start: 2023-01-01

## 2023-03-24 ENCOUNTER — ANCILLARY PROCEDURE (OUTPATIENT)
Dept: MAMMOGRAPHY | Facility: CLINIC | Age: 53
End: 2023-03-24
Attending: NURSE PRACTITIONER
Payer: COMMERCIAL

## 2023-03-24 DIAGNOSIS — Z12.31 VISIT FOR SCREENING MAMMOGRAM: ICD-10-CM

## 2023-03-24 PROCEDURE — 77067 SCR MAMMO BI INCL CAD: CPT

## 2023-05-09 ENCOUNTER — TRANSFERRED RECORDS (OUTPATIENT)
Dept: HEALTH INFORMATION MANAGEMENT | Facility: CLINIC | Age: 53
End: 2023-05-09
Payer: COMMERCIAL

## 2023-08-09 DIAGNOSIS — M17.11 PRIMARY OSTEOARTHRITIS OF RIGHT KNEE: ICD-10-CM

## 2023-08-09 RX ORDER — NAPROXEN 500 MG/1
500 TABLET ORAL 2 TIMES DAILY PRN
Qty: 180 TABLET | Refills: 0 | Status: SHIPPED | OUTPATIENT
Start: 2023-08-09 | End: 2023-11-03

## 2023-08-09 NOTE — TELEPHONE ENCOUNTER
"Last Written Prescription Date:  5/4/23  Last Fill Quantity: 60,  # refills: 0   Last office visit provider:  12/13/22     Requested Prescriptions   Pending Prescriptions Disp Refills    naproxen (NAPROSYN) 500 MG tablet [Pharmacy Med Name: NAPROXEN 500MG TABLETS] 60 tablet 0     Sig: TAKE 1 TABLET(500 MG) BY MOUTH TWICE DAILY AS NEEDED FOR MODERATE PAIN       NSAID Medications Passed - 8/9/2023 10:39 AM        Passed - Blood pressure under 140/90 in past 12 months     BP Readings from Last 3 Encounters:   12/13/22 98/82   01/15/22 113/75   08/27/21 102/58                 Passed - Normal ALT on file in past 12 months     Recent Labs   Lab Test 12/13/22  0945   ALT 21             Passed - Normal AST on file in past 12 months     Recent Labs   Lab Test 12/13/22  0945   AST 32             Passed - Recent (12 mo) or future (30 days) visit within the authorizing provider's specialty     Patient has had an office visit with the authorizing provider or a provider within the authorizing providers department within the previous 12 mos or has a future within next 30 days. See \"Patient Info\" tab in inbasket, or \"Choose Columns\" in Meds & Orders section of the refill encounter.              Passed - Patient is age 6-64 years        Passed - Normal CBC on file in past 12 months     Recent Labs   Lab Test 12/13/22  0945   WBC 4.5   RBC 4.99   HGB 15.3   HCT 43.2                    Passed - Medication is active on med list        Passed - No active pregnancy on record        Passed - Normal serum creatinine on file in past 12 months     Recent Labs   Lab Test 12/13/22  0945   CR 0.69       Ok to refill medication if creatinine is low          Passed - No positive pregnancy test in past 12 months             Jerod Norton RN 08/09/23 3:03 PM  "

## 2023-11-03 DIAGNOSIS — M17.11 PRIMARY OSTEOARTHRITIS OF RIGHT KNEE: ICD-10-CM

## 2023-11-03 RX ORDER — NAPROXEN 500 MG/1
500 TABLET ORAL 2 TIMES DAILY PRN
Qty: 60 TABLET | Refills: 0 | Status: SHIPPED | OUTPATIENT
Start: 2023-11-03 | End: 2024-01-19

## 2023-11-12 DIAGNOSIS — R60.9 EDEMA, UNSPECIFIED TYPE: ICD-10-CM

## 2023-11-13 RX ORDER — CHLORTHALIDONE 25 MG/1
25 TABLET ORAL DAILY
Qty: 90 TABLET | Refills: 0 | Status: SHIPPED | OUTPATIENT
Start: 2023-11-13 | End: 2024-01-19

## 2023-12-20 DIAGNOSIS — J45.30 MILD PERSISTENT ASTHMA WITHOUT COMPLICATION: ICD-10-CM

## 2023-12-20 RX ORDER — BUDESONIDE AND FORMOTEROL FUMARATE DIHYDRATE 80; 4.5 UG/1; UG/1
2 AEROSOL RESPIRATORY (INHALATION) 2 TIMES DAILY
Qty: 10.2 G | Refills: 1 | Status: SHIPPED | OUTPATIENT
Start: 2023-12-20 | End: 2024-01-19

## 2023-12-20 RX ORDER — ALBUTEROL SULFATE 90 UG/1
2 AEROSOL, METERED RESPIRATORY (INHALATION) EVERY 4 HOURS PRN
Qty: 17 G | Refills: 1 | Status: SHIPPED | OUTPATIENT
Start: 2023-12-20 | End: 2024-01-19

## 2024-01-17 ASSESSMENT — ASTHMA QUESTIONNAIRES
QUESTION_5 LAST FOUR WEEKS HOW WOULD YOU RATE YOUR ASTHMA CONTROL: SOMEWHAT CONTROLLED
QUESTION_3 LAST FOUR WEEKS HOW OFTEN DID YOUR ASTHMA SYMPTOMS (WHEEZING, COUGHING, SHORTNESS OF BREATH, CHEST TIGHTNESS OR PAIN) WAKE YOU UP AT NIGHT OR EARLIER THAN USUAL IN THE MORNING: TWO OR THREE NIGHTS A WEEK
QUESTION_2 LAST FOUR WEEKS HOW OFTEN HAVE YOU HAD SHORTNESS OF BREATH: THREE TO SIX TIMES A WEEK
QUESTION_4 LAST FOUR WEEKS HOW OFTEN HAVE YOU USED YOUR RESCUE INHALER OR NEBULIZER MEDICATION (SUCH AS ALBUTEROL): ONE OR TWO TIMES PER DAY
QUESTION_1 LAST FOUR WEEKS HOW MUCH OF THE TIME DID YOUR ASTHMA KEEP YOU FROM GETTING AS MUCH DONE AT WORK, SCHOOL OR AT HOME: NONE OF THE TIME
ACT_TOTALSCORE: 15
ACT_TOTALSCORE: 15

## 2024-01-17 ASSESSMENT — ENCOUNTER SYMPTOMS
PALPITATIONS: 0
PARESTHESIAS: 0
COUGH: 0
CONSTIPATION: 0
NAUSEA: 0
FEVER: 0
CHILLS: 0
DYSURIA: 0
HEMATOCHEZIA: 0
EYE PAIN: 0
ABDOMINAL PAIN: 0
HEADACHES: 0
SHORTNESS OF BREATH: 0
FREQUENCY: 0
DIARRHEA: 0
MYALGIAS: 0
JOINT SWELLING: 0
HEMATURIA: 0
BREAST MASS: 0
DIZZINESS: 0
WEAKNESS: 0
ARTHRALGIAS: 0
SORE THROAT: 0
HEARTBURN: 0
NERVOUS/ANXIOUS: 0

## 2024-01-18 PROBLEM — D12.3 BENIGN NEOPLASM OF TRANSVERSE COLON: Status: ACTIVE | Noted: 2023-05-11

## 2024-01-18 PROBLEM — K63.5 POLYP OF COLON: Status: ACTIVE | Noted: 2023-05-09

## 2024-01-18 PROBLEM — D12.2 BENIGN NEOPLASM OF ASCENDING COLON: Status: ACTIVE | Noted: 2023-05-11

## 2024-01-19 ENCOUNTER — OFFICE VISIT (OUTPATIENT)
Dept: FAMILY MEDICINE | Facility: CLINIC | Age: 54
End: 2024-01-19
Attending: NURSE PRACTITIONER
Payer: COMMERCIAL

## 2024-01-19 VITALS
BODY MASS INDEX: 27.65 KG/M2 | RESPIRATION RATE: 14 BRPM | HEIGHT: 67 IN | OXYGEN SATURATION: 98 % | WEIGHT: 176.2 LBS | DIASTOLIC BLOOD PRESSURE: 76 MMHG | SYSTOLIC BLOOD PRESSURE: 124 MMHG | TEMPERATURE: 98.8 F | HEART RATE: 69 BPM

## 2024-01-19 DIAGNOSIS — R82.90 ABNORMAL URINALYSIS: ICD-10-CM

## 2024-01-19 DIAGNOSIS — T75.3XXA MOTION SICKNESS, INITIAL ENCOUNTER: ICD-10-CM

## 2024-01-19 DIAGNOSIS — Z91.013 SHELLFISH ALLERGY: ICD-10-CM

## 2024-01-19 DIAGNOSIS — R82.90 ABNORMAL URINALYSIS: Primary | ICD-10-CM

## 2024-01-19 DIAGNOSIS — M17.11 PRIMARY OSTEOARTHRITIS OF RIGHT KNEE: ICD-10-CM

## 2024-01-19 DIAGNOSIS — R73.03 PREDIABETES: ICD-10-CM

## 2024-01-19 DIAGNOSIS — L21.9 SEBORRHEIC DERMATITIS OF SCALP: ICD-10-CM

## 2024-01-19 DIAGNOSIS — J45.30 MILD PERSISTENT ASTHMA WITHOUT COMPLICATION: ICD-10-CM

## 2024-01-19 DIAGNOSIS — Z00.00 ENCOUNTER FOR ROUTINE HISTORY AND PHYSICAL EXAM IN FEMALE: Primary | ICD-10-CM

## 2024-01-19 DIAGNOSIS — Z12.31 ENCOUNTER FOR SCREENING MAMMOGRAM FOR BREAST CANCER: ICD-10-CM

## 2024-01-19 DIAGNOSIS — Z13.220 LIPID SCREENING: ICD-10-CM

## 2024-01-19 DIAGNOSIS — R60.9 EDEMA, UNSPECIFIED TYPE: ICD-10-CM

## 2024-01-19 LAB
ALBUMIN UR-MCNC: ABNORMAL MG/DL
AMORPH CRY #/AREA URNS HPF: ABNORMAL /HPF
APPEARANCE UR: CLEAR
BACTERIA #/AREA URNS HPF: ABNORMAL /HPF
BILIRUB UR QL STRIP: NEGATIVE
COLOR UR AUTO: YELLOW
ERYTHROCYTE [DISTWIDTH] IN BLOOD BY AUTOMATED COUNT: 11.9 % (ref 10–15)
GLUCOSE UR STRIP-MCNC: NEGATIVE MG/DL
HBA1C MFR BLD: 5.9 % (ref 0–5.6)
HCT VFR BLD AUTO: 42.4 % (ref 35–47)
HGB BLD-MCNC: 14.4 G/DL (ref 11.7–15.7)
HGB UR QL STRIP: NEGATIVE
KETONES UR STRIP-MCNC: NEGATIVE MG/DL
LEUKOCYTE ESTERASE UR QL STRIP: ABNORMAL
MCH RBC QN AUTO: 30.1 PG (ref 26.5–33)
MCHC RBC AUTO-ENTMCNC: 34 G/DL (ref 31.5–36.5)
MCV RBC AUTO: 89 FL (ref 78–100)
NITRATE UR QL: NEGATIVE
PH UR STRIP: 7.5 [PH] (ref 5–8)
PLATELET # BLD AUTO: 406 10E3/UL (ref 150–450)
RBC # BLD AUTO: 4.78 10E6/UL (ref 3.8–5.2)
RBC #/AREA URNS AUTO: ABNORMAL /HPF
SP GR UR STRIP: 1.02 (ref 1–1.03)
SQUAMOUS #/AREA URNS AUTO: ABNORMAL /LPF
TRANS CELLS #/AREA URNS HPF: ABNORMAL /HPF
UROBILINOGEN UR STRIP-ACNC: 1 E.U./DL
WBC # BLD AUTO: 3.8 10E3/UL (ref 4–11)
WBC #/AREA URNS AUTO: ABNORMAL /HPF

## 2024-01-19 PROCEDURE — 99213 OFFICE O/P EST LOW 20 MIN: CPT | Mod: 25 | Performed by: NURSE PRACTITIONER

## 2024-01-19 PROCEDURE — 84443 ASSAY THYROID STIM HORMONE: CPT | Performed by: NURSE PRACTITIONER

## 2024-01-19 PROCEDURE — 80053 COMPREHEN METABOLIC PANEL: CPT | Performed by: NURSE PRACTITIONER

## 2024-01-19 PROCEDURE — 83036 HEMOGLOBIN GLYCOSYLATED A1C: CPT | Performed by: NURSE PRACTITIONER

## 2024-01-19 PROCEDURE — 85027 COMPLETE CBC AUTOMATED: CPT | Performed by: NURSE PRACTITIONER

## 2024-01-19 PROCEDURE — 87086 URINE CULTURE/COLONY COUNT: CPT | Performed by: NURSE PRACTITIONER

## 2024-01-19 PROCEDURE — 81001 URINALYSIS AUTO W/SCOPE: CPT | Performed by: NURSE PRACTITIONER

## 2024-01-19 PROCEDURE — 36415 COLL VENOUS BLD VENIPUNCTURE: CPT | Performed by: NURSE PRACTITIONER

## 2024-01-19 PROCEDURE — 80061 LIPID PANEL: CPT | Performed by: NURSE PRACTITIONER

## 2024-01-19 PROCEDURE — 99396 PREV VISIT EST AGE 40-64: CPT | Performed by: NURSE PRACTITIONER

## 2024-01-19 RX ORDER — CHLORTHALIDONE 25 MG/1
25 TABLET ORAL DAILY
Qty: 90 TABLET | Refills: 0 | Status: SHIPPED | OUTPATIENT
Start: 2024-01-19 | End: 2024-05-01

## 2024-01-19 RX ORDER — EPINEPHRINE 0.3 MG/.3ML
INJECTION SUBCUTANEOUS
Qty: 1 EACH | Refills: 1 | Status: SHIPPED | OUTPATIENT
Start: 2024-01-19

## 2024-01-19 RX ORDER — BUDESONIDE AND FORMOTEROL FUMARATE DIHYDRATE 80; 4.5 UG/1; UG/1
2 AEROSOL RESPIRATORY (INHALATION) 2 TIMES DAILY
Qty: 10.2 G | Refills: 11 | Status: SHIPPED | OUTPATIENT
Start: 2024-01-19

## 2024-01-19 RX ORDER — ALBUTEROL SULFATE 90 UG/1
2 AEROSOL, METERED RESPIRATORY (INHALATION) EVERY 4 HOURS PRN
Qty: 17 G | Refills: 1 | Status: SHIPPED | OUTPATIENT
Start: 2024-01-19

## 2024-01-19 RX ORDER — NAPROXEN 500 MG/1
500 TABLET ORAL 2 TIMES DAILY PRN
Qty: 60 TABLET | Refills: 0 | Status: SHIPPED | OUTPATIENT
Start: 2024-01-19

## 2024-01-19 RX ORDER — CELECOXIB 200 MG/1
200 CAPSULE ORAL DAILY PRN
Qty: 90 CAPSULE | Refills: 2 | Status: SHIPPED | OUTPATIENT
Start: 2024-01-19

## 2024-01-19 RX ORDER — SCOLOPAMINE TRANSDERMAL SYSTEM 1 MG/1
1 PATCH, EXTENDED RELEASE TRANSDERMAL
Qty: 4 PATCH | Refills: 1 | Status: SHIPPED | OUTPATIENT
Start: 2024-01-19

## 2024-01-19 ASSESSMENT — PAIN SCALES - GENERAL: PAINLEVEL: NO PAIN (0)

## 2024-01-19 NOTE — PROGRESS NOTES
Assessment and Plan:    Encounter for routine history and physical exam in female  Recommend consuming a healthy diet and exercising.  She declines HIV screening.  She declines COVID-vaccine.  She is up-to-date on breast cancer, cervical cancer, and colorectal cancer screening.  - CBC with platelets  - Comprehensive metabolic panel  - TSH with free T4 reflex  - UA Macroscopic with reflex to Microscopic and Culture    Encounter for screening mammogram for breast cancer  - *MA Screening Digital Bilateral    Lipid screening  Will check lipid cascade.  - Lipid panel reflex to direct LDL Fasting    Mild persistent asthma without complication  This is controlled.  She continues Symbicort daily and albuterol as needed.  - albuterol (PROAIR HFA/PROVENTIL HFA/VENTOLIN HFA) 108 (90 Base) MCG/ACT inhaler  Dispense: 17 g; Refill: 1  - budesonide-formoterol (SYMBICORT) 80-4.5 MCG/ACT Inhaler  Dispense: 10.2 g; Refill: 11    Primary osteoarthritis of right knee  We discussed potential ramifications of long-term NSAID use including renal impairment and gastric inflammation.  I encouraged alternating with acetaminophen as needed.  - celecoxib (CELEBREX) 200 MG capsule  Dispense: 90 capsule; Refill: 2  - naproxen (NAPROSYN) 500 MG tablet  Dispense: 60 tablet; Refill: 0    Edema, unspecified type  This is controlled with chlorthalidone as needed.  - chlorthalidone (HYGROTON) 25 MG tablet  Dispense: 90 tablet; Refill: 0    Shellfish allergy  Patient has EpiPen to use as needed.  - EPINEPHrine (ANY BX GENERIC EQUIV) 0.3 MG/0.3ML injection 2-pack  Dispense: 1 each; Refill: 1    Motion sickness, initial encounter  Patient continues scopolamine as needed.  - scopolamine (TRANSDERM) 1 MG/3DAYS 72 hr patch  Dispense: 4 patch; Refill: 1    Prediabetes  - Hemoglobin A1c    Seborrheic dermatitis of scalp  Patient continues fluocinolone as needed.       Subjective:     Neisha is a 53 year old female presenting to the clinic for a female  physical.     LMP: at age 45, no bleeding since   Hx of abnormal pap smear: 2017 low grade TERRIE 1   Last pap smear: 12/13/22 normal, negative HPV   Perform self-breast exams: yes   Vaginal discharge or irritation: none   Sexually active: yes,  for 30 years   Contraception: none   Concerns for STDs: none   Previous pregnancies:two pregnancies (vaginal deliveries)  Daughter 28 and Son 25   Granddaughter is 7      Patient has a history of asthma which is well controlled.  She uses her albuterol inhaler 1 time per week.  Smoke exposure, exercise, cold weather exposure exacerbate her asthma.  Patient has a history of osteoarthritis and takes Celebrex or Naproxen as needed.  She uses chlorthalidone for lower extremity swelling.  She has a history of hypokalemia and is not currently taking potassium supplementation.  Patient uses scopolamine patches as needed for motion sickness with travel. She plans on taking her granddaughter to [place].  She has a history of anaphylactic reaction to shellfish.  Patient requests refill of EpiPen.  She has a history of prediabetes with an A1c of 5.9% on 12/13/2022.  She has a history of seborrheic dermatitis and uses fluocinolone solution as needed.    Review of systems:  I performed a 10 point review of systems.  All pertinent positives and negatives are noted in the HPI. All others are negative.     Allergies   Allergen Reactions    Shellfish-Derived Products Difficulty breathing     All shellfish    Hornets Other (See Comments)    Adhesive Tape Rash    Latex Rash       Current Outpatient Medications   Medication    albuterol (PROAIR HFA/PROVENTIL HFA/VENTOLIN HFA) 108 (90 Base) MCG/ACT inhaler    budesonide-formoterol (SYMBICORT) 80-4.5 MCG/ACT Inhaler    celecoxib (CELEBREX) 200 MG capsule    chlorthalidone (HYGROTON) 25 MG tablet    clobetasol propionate (CLOBEX) 0.05 % external shampoo    EPINEPHrine (ANY BX GENERIC EQUIV) 0.3 MG/0.3ML injection 2-pack    fluocinolone  (DERMA-SMOOTHE) 0.01 % external oil    fluocinolone (SYNALAR) 0.01 % solution    ketoconazole (NIZORAL) 2 % shampoo    naproxen (NAPROSYN) 500 MG tablet    scopolamine (TRANSDERM) 1 MG/3DAYS 72 hr patch    triamcinolone (KENALOG) 0.1 % external ointment     No current facility-administered medications for this visit.       Social History     Socioeconomic History    Marital status:      Spouse name: Not on file    Number of children: Not on file    Years of education: Not on file    Highest education level: Not on file   Occupational History    Not on file   Tobacco Use    Smoking status: Never    Smokeless tobacco: Never   Substance and Sexual Activity    Alcohol use: Not Currently    Drug use: Never    Sexual activity: Not on file     Comment:     Other Topics Concern    Not on file   Social History Narrative    Not on file     Social Determinants of Health     Financial Resource Strain: Low Risk  (1/17/2024)    Financial Resource Strain     Within the past 12 months, have you or your family members you live with been unable to get utilities (heat, electricity) when it was really needed?: No   Food Insecurity: Low Risk  (1/17/2024)    Food Insecurity     Within the past 12 months, did you worry that your food would run out before you got money to buy more?: No     Within the past 12 months, did the food you bought just not last and you didn t have money to get more?: No   Transportation Needs: Low Risk  (1/17/2024)    Transportation Needs     Within the past 12 months, has lack of transportation kept you from medical appointments, getting your medicines, non-medical meetings or appointments, work, or from getting things that you need?: No   Physical Activity: Not on file   Stress: Not on file   Social Connections: Not on file   Interpersonal Safety: Low Risk  (1/19/2024)    Interpersonal Safety     Do you feel physically and emotionally safe where you currently live?: Yes     Within the past 12  "months, have you been hit, slapped, kicked or otherwise physically hurt by someone?: No     Within the past 12 months, have you been humiliated or emotionally abused in other ways by your partner or ex-partner?: No   Housing Stability: Low Risk  (1/17/2024)    Housing Stability     Do you have housing? : Yes     Are you worried about losing your housing?: No       Past Medical History:   Diagnosis Date    Asthma        Family History   Problem Relation Age of Onset    Osteopenia Mother     Hyperlipidemia Father     Deep Vein Thrombosis Father     Diabetes Father     Pancreatic Cancer Father        Past Surgical History:   Procedure Laterality Date    ARTHROSCOPY KNEE Right     ARTHROSCOPY KNEE PROCEDURE CARTILAGE (GENZYME) Right        Objective:     /76 (BP Location: Right arm, Patient Position: Sitting, Cuff Size: Adult Regular)   Pulse 69   Temp 98.8  F (37.1  C) (Temporal)   Resp 14   Ht 1.697 m (5' 6.81\")   Wt 79.9 kg (176 lb 3.2 oz)   SpO2 98%   BMI 27.75 kg/m      Patient is alert, no obvious distress.   Skin: Warm, dry.  No rashes or lesions. Skin turgor rapid return.   HEENT:  Eyes normal.  Ears normal.  Nose patent, mucosa pink.  Oropharynx mucosa pink, no lesions or tonsil enlargement.   Neck:  Supple, without lymphadenopathy, bruits, JVD. Thyroid normal texture and size.    Lungs:  Clear to auscultation.  No wheezing, rales noted.  Respirations even and unlabored.   Heart:  Regular rate and rhythm.  No murmurs.   Breasts:  Normal.  No surrounding adenopathy.   Abdomen: Soft, nontender.  No organomegaly.  Bowel sounds normoactive.  No guarding or masses noted.   :  deferred  Musculoskeletal:  Full ROM of extremities.  Muscle strength equal +5/5.   Neurological:  Cranial nerves 2-12 intact.              Answers submitted by the patient for this visit:  Annual Preventive Visit (Submitted on 1/17/2024)  Chief Complaint: Annual Exam:  Frequency of exercise:: 2-3 days/week  Getting at least 3 " servings of Calcium per day:: Yes  Diet:: Regular (no restrictions)  Taking medications regularly:: Yes  Medication side effects:: None  Bi-annual eye exam:: Yes  Dental care twice a year:: Yes  Sleep apnea or symptoms of sleep apnea:: Daytime drowsiness  abdominal pain: No  Blood in stool: No  Blood in urine: No  chest pain: No  chills: No  congestion: No  constipation: No  cough: No  diarrhea: No  dizziness: No  ear pain: No  eye pain: No  nervous/anxious: No  fever: No  frequency: No  genital sores: No  headaches: No  hearing loss: No  heartburn: No  arthralgias: No  joint swelling: No  peripheral edema: No  mood changes: No  myalgias: No  nausea: No  dysuria: No  palpitations: No  Skin sensation changes: No  sore throat: No  urgency: No  rash: No  shortness of breath: No  visual disturbance: No  weakness: No  pelvic pain: No  vaginal bleeding: No  vaginal discharge: No  tenderness: No  breast mass: No  breast discharge: No  Additional concerns today:: No  Exercise outside of work (Submitted on 1/17/2024)  Chief Complaint: Annual Exam:  Duration of exercise:: 15-30 minutes

## 2024-01-20 PROBLEM — R73.03 PREDIABETES: Status: ACTIVE | Noted: 2024-01-20

## 2024-01-20 LAB
ALBUMIN SERPL BCG-MCNC: 4.4 G/DL (ref 3.5–5.2)
ALP SERPL-CCNC: 75 U/L (ref 40–150)
ALT SERPL W P-5'-P-CCNC: 23 U/L (ref 0–50)
ANION GAP SERPL CALCULATED.3IONS-SCNC: 7 MMOL/L (ref 7–15)
AST SERPL W P-5'-P-CCNC: 31 U/L (ref 0–45)
BILIRUB SERPL-MCNC: 0.7 MG/DL
BUN SERPL-MCNC: 12.7 MG/DL (ref 6–20)
CALCIUM SERPL-MCNC: 9.7 MG/DL (ref 8.6–10)
CHLORIDE SERPL-SCNC: 101 MMOL/L (ref 98–107)
CHOLEST SERPL-MCNC: 196 MG/DL
CREAT SERPL-MCNC: 0.71 MG/DL (ref 0.51–0.95)
DEPRECATED HCO3 PLAS-SCNC: 30 MMOL/L (ref 22–29)
EGFRCR SERPLBLD CKD-EPI 2021: >90 ML/MIN/1.73M2
FASTING STATUS PATIENT QL REPORTED: YES
GLUCOSE SERPL-MCNC: 99 MG/DL (ref 70–99)
HDLC SERPL-MCNC: 57 MG/DL
LDLC SERPL CALC-MCNC: 125 MG/DL
NONHDLC SERPL-MCNC: 139 MG/DL
POTASSIUM SERPL-SCNC: 4.2 MMOL/L (ref 3.4–5.3)
PROT SERPL-MCNC: 7.3 G/DL (ref 6.4–8.3)
SODIUM SERPL-SCNC: 138 MMOL/L (ref 135–145)
TRIGL SERPL-MCNC: 72 MG/DL
TSH SERPL DL<=0.005 MIU/L-ACNC: 1.15 UIU/ML (ref 0.3–4.2)

## 2024-01-21 LAB — BACTERIA UR CULT: NORMAL

## 2024-01-22 ENCOUNTER — TELEPHONE (OUTPATIENT)
Dept: FAMILY MEDICINE | Facility: CLINIC | Age: 54
End: 2024-01-22
Payer: COMMERCIAL

## 2024-01-22 DIAGNOSIS — R31.21 ASYMPTOMATIC MICROSCOPIC HEMATURIA: Primary | ICD-10-CM

## 2024-01-22 NOTE — TELEPHONE ENCOUNTER
Informed pt of info and she verbalized understanding. No other questions at this time.    JULIA BazziN, RN  [facility]

## 2024-01-22 NOTE — TELEPHONE ENCOUNTER
Writer called patient and left message to return call to clinic.     If patient returns call please relay results per PCP and provide number for [facility]:    [facility] [place] [facility]   [address] [address]   Phone: [phone]        RUBY Burgess, RN  [facility]      ----- Message from JOAO Solis CNP sent at 1/22/2024  3:49 PM CST -----  Please notify the patient that the urine culture is normal, so a urinary tract infection is not present.  The urine is showing blood, though. This can be a sign of bladder irritation, kidney stones, and bladder cancer.  I placed a referral to [facility] in [place] for further evaluation.  Please provide the phone number.

## 2024-02-20 DIAGNOSIS — R73.03 PREDIABETES: Primary | ICD-10-CM

## 2024-02-20 DIAGNOSIS — R73.03 PREDIABETES: ICD-10-CM

## 2024-02-20 RX ORDER — METFORMIN HCL 500 MG
500 TABLET, EXTENDED RELEASE 24 HR ORAL
Qty: 30 TABLET | Refills: 5 | Status: SHIPPED | OUTPATIENT
Start: 2024-02-20 | End: 2024-02-20

## 2024-02-20 RX ORDER — METFORMIN HCL 500 MG
500 TABLET, EXTENDED RELEASE 24 HR ORAL
Qty: 90 TABLET | Refills: 1 | Status: SHIPPED | OUTPATIENT
Start: 2024-02-20 | End: 2024-09-13

## 2024-03-15 ENCOUNTER — MYC REFILL (OUTPATIENT)
Dept: FAMILY MEDICINE | Facility: CLINIC | Age: 54
End: 2024-03-15
Payer: COMMERCIAL

## 2024-03-15 DIAGNOSIS — L21.9 SEBORRHEIC DERMATITIS OF SCALP: Primary | ICD-10-CM

## 2024-03-16 DIAGNOSIS — R60.9 EDEMA, UNSPECIFIED TYPE: ICD-10-CM

## 2024-03-16 RX ORDER — FLUOCINOLONE ACETONIDE 0.11 MG/ML
OIL TOPICAL
Qty: 20 ML | Refills: 3 | Status: SHIPPED | OUTPATIENT
Start: 2024-03-16

## 2024-03-16 RX ORDER — KETOCONAZOLE 20 MG/ML
SHAMPOO TOPICAL DAILY PRN
Qty: 120 ML | Refills: 3 | Status: SHIPPED | OUTPATIENT
Start: 2024-03-16

## 2024-03-18 RX ORDER — CHLORTHALIDONE 25 MG/1
25 TABLET ORAL DAILY
Qty: 90 TABLET | Refills: 0 | OUTPATIENT
Start: 2024-03-18

## 2024-04-22 ENCOUNTER — TRANSFERRED RECORDS (OUTPATIENT)
Dept: HEALTH INFORMATION MANAGEMENT | Facility: CLINIC | Age: 54
End: 2024-04-22
Payer: COMMERCIAL

## 2024-05-01 DIAGNOSIS — R60.9 EDEMA, UNSPECIFIED TYPE: ICD-10-CM

## 2024-05-01 RX ORDER — CHLORTHALIDONE 25 MG/1
25 TABLET ORAL DAILY
Qty: 90 TABLET | Refills: 2 | Status: SHIPPED | OUTPATIENT
Start: 2024-05-01

## 2024-09-12 DIAGNOSIS — R73.03 PREDIABETES: ICD-10-CM

## 2024-09-13 RX ORDER — METFORMIN HCL 500 MG
500 TABLET, EXTENDED RELEASE 24 HR ORAL
Qty: 30 TABLET | Refills: 0 | Status: SHIPPED | OUTPATIENT
Start: 2024-09-13

## 2024-10-11 DIAGNOSIS — J45.30 MILD PERSISTENT ASTHMA WITHOUT COMPLICATION: ICD-10-CM

## 2024-10-14 RX ORDER — ALBUTEROL SULFATE 90 UG/1
2 INHALANT RESPIRATORY (INHALATION) EVERY 4 HOURS PRN
Qty: 17 G | Refills: 1 | Status: SHIPPED | OUTPATIENT
Start: 2024-10-14

## 2024-12-17 ENCOUNTER — MYC MEDICAL ADVICE (OUTPATIENT)
Dept: FAMILY MEDICINE | Facility: CLINIC | Age: 54
End: 2024-12-17

## 2024-12-17 ENCOUNTER — OFFICE VISIT (OUTPATIENT)
Dept: FAMILY MEDICINE | Facility: CLINIC | Age: 54
End: 2024-12-17
Payer: COMMERCIAL

## 2024-12-17 VITALS
OXYGEN SATURATION: 99 % | DIASTOLIC BLOOD PRESSURE: 72 MMHG | HEART RATE: 68 BPM | WEIGHT: 168 LBS | TEMPERATURE: 97.3 F | BODY MASS INDEX: 26.37 KG/M2 | SYSTOLIC BLOOD PRESSURE: 118 MMHG | HEIGHT: 67 IN

## 2024-12-17 DIAGNOSIS — R22.2 MASS OF BUTTOCK: Primary | ICD-10-CM

## 2024-12-17 PROCEDURE — 99213 OFFICE O/P EST LOW 20 MIN: CPT | Performed by: PHYSICIAN ASSISTANT

## 2024-12-17 ASSESSMENT — ASTHMA QUESTIONNAIRES
QUESTION_5 LAST FOUR WEEKS HOW WOULD YOU RATE YOUR ASTHMA CONTROL: WELL CONTROLLED
QUESTION_3 LAST FOUR WEEKS HOW OFTEN DID YOUR ASTHMA SYMPTOMS (WHEEZING, COUGHING, SHORTNESS OF BREATH, CHEST TIGHTNESS OR PAIN) WAKE YOU UP AT NIGHT OR EARLIER THAN USUAL IN THE MORNING: NOT AT ALL
QUESTION_2 LAST FOUR WEEKS HOW OFTEN HAVE YOU HAD SHORTNESS OF BREATH: ONCE OR TWICE A WEEK
ACT_TOTALSCORE: 22
ACT_TOTALSCORE: 22
QUESTION_4 LAST FOUR WEEKS HOW OFTEN HAVE YOU USED YOUR RESCUE INHALER OR NEBULIZER MEDICATION (SUCH AS ALBUTEROL): ONCE A WEEK OR LESS
QUESTION_1 LAST FOUR WEEKS HOW MUCH OF THE TIME DID YOUR ASTHMA KEEP YOU FROM GETTING AS MUCH DONE AT WORK, SCHOOL OR AT HOME: NONE OF THE TIME

## 2024-12-17 NOTE — PROGRESS NOTES
"  Assessment & Plan     Mass of buttock  X 1 week, cyst vs other.    Will get us to evaluate.   - US Soft Tissue Pelvic Region            BMI  Estimated body mass index is 26.71 kg/m  as calculated from the following:    Height as of this encounter: 1.689 m (5' 6.5\").    Weight as of this encounter: 76.2 kg (168 lb).   Weight management plan:            Sanju Fields is a 54 year old, presenting for the following health issues:  Mass (On rectum noticed for about 1 week, )        12/17/2024     9:56 AM   Additional Questions   Roomed by CHUCKY Sosa   Accompanied by self     History of Present Illness       Reason for visit:  Lump by rectum, mole? on back  Symptom onset:  1-2 weeks ago  Symptoms include:  Discomfort  Symptom intensity:  Mild  Symptom progression:  Improving  Had these symptoms before:  No  What makes it worse:  No  What makes it better:  No She is missing 1 dose(s) of medications per week.  She is not taking prescribed medications regularly due to other.       Lump near rectum, right buttock.  first noticed about a week ago, tender if she sits, otherwise nontender.  No fevers, chills or body aches.  Denies recent illness. Denies abdominal pain, n/v/d or constipation.  No blood in stools. Denies vaginal symptoms      Review of Systems  Constitutional, HEENT, cardiovascular, pulmonary, gi and gu systems are negative, except as otherwise noted.      Objective    /72   Pulse 68   Temp 97.3  F (36.3  C)   Ht 1.689 m (5' 6.5\")   Wt 76.2 kg (168 lb)   SpO2 99%   BMI 26.71 kg/m    Body mass index is 26.71 kg/m .  Physical Exam   GENERAL: alert and no distress  EYES: Eyes grossly normal to inspection, PERRL and conjunctivae and sclerae normal  ABDOMEN: soft, nontender, no hepatosplenomegaly, no masses and bowel sounds normal   (female): normal female external genitalia, normal urethral meatus, normal vaginal mucosa.  Soft tissue mass proximal right buttock around 4 cm in size, nttp  MS: " no gross musculoskeletal defects noted, no edema  SKIN: no suspicious lesions or rashes  NEURO: Normal strength and tone, mentation intact and speech normal  PSYCH: mentation appears normal, affect normal/bright            Signed Electronically by: Aleksandra Lima PA-C

## 2024-12-27 ENCOUNTER — HOSPITAL ENCOUNTER (OUTPATIENT)
Dept: ULTRASOUND IMAGING | Facility: HOSPITAL | Age: 54
Discharge: HOME | End: 2024-12-27
Attending: PHYSICIAN ASSISTANT | Admitting: PHYSICIAN ASSISTANT
Payer: COMMERCIAL

## 2024-12-27 DIAGNOSIS — R22.2 MASS OF BUTTOCK: ICD-10-CM

## 2024-12-27 PROCEDURE — 76857 US EXAM PELVIC LIMITED: CPT

## 2025-02-10 DIAGNOSIS — R60.9 EDEMA, UNSPECIFIED TYPE: ICD-10-CM

## 2025-02-10 DIAGNOSIS — R73.03 PREDIABETES: ICD-10-CM

## 2025-02-11 RX ORDER — CHLORTHALIDONE 25 MG/1
25 TABLET ORAL DAILY
Qty: 90 TABLET | Refills: 2 | Status: SHIPPED | OUTPATIENT
Start: 2025-02-11

## 2025-02-11 RX ORDER — METFORMIN HYDROCHLORIDE 500 MG/1
500 TABLET, EXTENDED RELEASE ORAL
Qty: 90 TABLET | Refills: 0 | Status: SHIPPED | OUTPATIENT
Start: 2025-02-11

## 2025-02-25 ENCOUNTER — PATIENT OUTREACH (OUTPATIENT)
Dept: CARE COORDINATION | Facility: CLINIC | Age: 55
End: 2025-02-25
Payer: COMMERCIAL

## 2025-03-16 ENCOUNTER — HEALTH MAINTENANCE LETTER (OUTPATIENT)
Age: 55
End: 2025-03-16

## 2025-05-04 ENCOUNTER — MYC REFILL (OUTPATIENT)
Dept: FAMILY MEDICINE | Facility: CLINIC | Age: 55
End: 2025-05-04
Payer: COMMERCIAL

## 2025-05-04 DIAGNOSIS — R73.03 PREDIABETES: ICD-10-CM

## 2025-05-04 DIAGNOSIS — R60.9 EDEMA, UNSPECIFIED TYPE: ICD-10-CM

## 2025-05-05 RX ORDER — METFORMIN HYDROCHLORIDE 500 MG/1
500 TABLET, EXTENDED RELEASE ORAL
Qty: 90 TABLET | Refills: 0 | Status: SHIPPED | OUTPATIENT
Start: 2025-05-05

## 2025-05-05 RX ORDER — CHLORTHALIDONE 25 MG/1
25 TABLET ORAL DAILY
Qty: 90 TABLET | Refills: 2 | OUTPATIENT
Start: 2025-05-05

## 2025-05-14 ENCOUNTER — ANESTHESIA EVENT (OUTPATIENT)
Dept: SURGERY | Facility: AMBULATORY SURGERY CENTER | Age: 55
End: 2025-05-14
Payer: COMMERCIAL

## 2025-05-15 ENCOUNTER — ANESTHESIA (OUTPATIENT)
Dept: SURGERY | Facility: AMBULATORY SURGERY CENTER | Age: 55
End: 2025-05-15
Payer: COMMERCIAL

## 2025-05-15 RX ORDER — ONDANSETRON 2 MG/ML
INJECTION INTRAMUSCULAR; INTRAVENOUS PRN
Status: DISCONTINUED | OUTPATIENT
Start: 2025-05-15 | End: 2025-05-15

## 2025-05-15 RX ORDER — LIDOCAINE HYDROCHLORIDE 20 MG/ML
INJECTION, SOLUTION INFILTRATION; PERINEURAL PRN
Status: DISCONTINUED | OUTPATIENT
Start: 2025-05-15 | End: 2025-05-15

## 2025-05-15 RX ORDER — GLYCOPYRROLATE 0.2 MG/ML
INJECTION, SOLUTION INTRAMUSCULAR; INTRAVENOUS PRN
Status: DISCONTINUED | OUTPATIENT
Start: 2025-05-15 | End: 2025-05-15

## 2025-05-15 RX ORDER — PROPOFOL 10 MG/ML
INJECTION, EMULSION INTRAVENOUS CONTINUOUS PRN
Status: DISCONTINUED | OUTPATIENT
Start: 2025-05-15 | End: 2025-05-15

## 2025-05-15 RX ORDER — PROPOFOL 10 MG/ML
INJECTION, EMULSION INTRAVENOUS PRN
Status: DISCONTINUED | OUTPATIENT
Start: 2025-05-15 | End: 2025-05-15

## 2025-05-15 RX ORDER — FENTANYL CITRATE 50 UG/ML
INJECTION, SOLUTION INTRAMUSCULAR; INTRAVENOUS PRN
Status: DISCONTINUED | OUTPATIENT
Start: 2025-05-15 | End: 2025-05-15

## 2025-05-15 RX ORDER — DEXAMETHASONE SODIUM PHOSPHATE 10 MG/ML
INJECTION, SOLUTION INTRAMUSCULAR; INTRAVENOUS PRN
Status: DISCONTINUED | OUTPATIENT
Start: 2025-05-15 | End: 2025-05-15

## 2025-05-15 RX ADMIN — ONDANSETRON 4 MG: 2 INJECTION INTRAMUSCULAR; INTRAVENOUS at 10:16

## 2025-05-15 RX ADMIN — DEXAMETHASONE SODIUM PHOSPHATE 4 MG: 10 INJECTION, SOLUTION INTRAMUSCULAR; INTRAVENOUS at 09:53

## 2025-05-15 RX ADMIN — PROPOFOL 180 MCG/KG/MIN: 10 INJECTION, EMULSION INTRAVENOUS at 09:53

## 2025-05-15 RX ADMIN — PROPOFOL 50 MG: 10 INJECTION, EMULSION INTRAVENOUS at 09:53

## 2025-05-15 RX ADMIN — FENTANYL CITRATE 50 MCG: 50 INJECTION, SOLUTION INTRAMUSCULAR; INTRAVENOUS at 10:05

## 2025-05-15 RX ADMIN — LIDOCAINE HYDROCHLORIDE 50 MG: 20 INJECTION, SOLUTION INFILTRATION; PERINEURAL at 09:53

## 2025-05-15 RX ADMIN — PROPOFOL 50 MG: 10 INJECTION, EMULSION INTRAVENOUS at 09:57

## 2025-05-15 RX ADMIN — GLYCOPYRROLATE 0.2 MG: 0.2 INJECTION, SOLUTION INTRAMUSCULAR; INTRAVENOUS at 10:09

## 2025-05-15 ASSESSMENT — ENCOUNTER SYMPTOMS: ORTHOPNEA: 0

## 2025-05-15 NOTE — ANESTHESIA PREPROCEDURE EVALUATION
Anesthesia Pre-Procedure Evaluation    Patient: Neisha Morton   MRN: [id number] : 1970          Procedure : Procedure(s):  EXAM UNDER ANESTHESIA OF RECTUM, POSSIBLE FISTULOTOMY AND SETON PLACEMENT         Past Medical History:   Diagnosis Date    Arthritis     Asthma     Motion sickness       Past Surgical History:   Procedure Laterality Date    ARTHROSCOPY KNEE Right     ARTHROSCOPY KNEE PROCEDURE CARTILAGE (GENZYME) Right       Allergies   Allergen Reactions    Shellfish-Derived Products Difficulty breathing     All shellfish    Hornets Other (See Comments)    Adhesive Tape Rash    Latex Rash      Social History     Tobacco Use    Smoking status: Never     Passive exposure: Never    Smokeless tobacco: Never   Substance Use Topics    Alcohol use: Not Currently      Wt Readings from Last 1 Encounters:   25 80.7 kg (178 lb)        Anesthesia Evaluation   Pt has had prior anesthetic.     No history of anesthetic complications       ROS/MED HX  ENT/Pulmonary:     (+)                     Mild Persistent, asthma               (-) sleep apnea   Neurologic:  - neg neurologic ROS     Cardiovascular: Comment: Chlorthalidone for peripheral edema   (-) BONNER and orthopnea/PND   METS/Exercise Tolerance: >4 METS    Hematologic:  - neg hematologic  ROS     Musculoskeletal:  - neg musculoskeletal ROS     GI/Hepatic: Comment: Perianal fistula      Renal/Genitourinary:  - neg Renal ROS     Endo:     (+)  type II DM,                 (-) obesity   Psychiatric/Substance Use:  - neg psychiatric ROS     Infectious Disease:       Malignancy:       Other:  - neg other ROS            Physical Exam  Airway  Mallampati: II  TM distance: <3 FB  Neck ROM: full  Mouth opening: < 4 cm    Cardiovascular   Rhythm: regular  Rate: normal rate   Comments: Chlorthalidone for peripheral edema  Dental   (+) Minor Abnormalities - some fillings, tiny chips      Pulmonary - normal examBreath sounds clear to auscultation        Neurological  "- normal exam  She appears awake, alert and oriented x3.    Other Findings       OUTSIDE LABS:  CBC:   Lab Results   Component Value Date    WBC 4.3 05/02/2025    WBC 3.8 (L) 01/19/2024    HGB 14.3 05/02/2025    HGB 14.4 01/19/2024    HCT 42.6 05/02/2025    HCT 42.4 01/19/2024     05/02/2025     01/19/2024     BMP:   Lab Results   Component Value Date     05/02/2025     01/19/2024    POTASSIUM 3.9 05/02/2025    POTASSIUM 4.2 01/19/2024    CHLORIDE 103 05/02/2025    CHLORIDE 101 01/19/2024    CO2 28 05/02/2025    CO2 30 (H) 01/19/2024    BUN 18.5 05/02/2025    BUN 12.7 01/19/2024    CR 0.74 05/02/2025    CR 0.71 01/19/2024     (H) 05/02/2025    GLC 99 01/19/2024     COAGS: No results found for: \"PTT\", \"INR\", \"FIBR\"  POC: No results found for: \"BGM\", \"HCG\", \"HCGS\"  HEPATIC:   Lab Results   Component Value Date    ALBUMIN 4.4 01/19/2024    PROTTOTAL 7.3 01/19/2024    ALT 23 01/19/2024    AST 31 01/19/2024    ALKPHOS 75 01/19/2024    BILITOTAL 0.7 01/19/2024     OTHER:   Lab Results   Component Value Date    A1C 5.9 (H) 05/02/2025    CLAUDIA 9.5 05/02/2025    TSH 1.15 01/19/2024       Anesthesia Plan    ASA Status:  2      NPO Status: NPO Appropriate   Anesthesia Type: MAC.  Airway: natural airway.   Techniques and Equipment:       - Monitoring Plan: standard ASA monitoring     Consents    Anesthesia Plan(s) and associated risks, benefits, and realistic alternatives discussed. Questions answered and patient/representative(s) expressed understanding.     - Discussed: anesthesiologist     - Discussed with:  Patient        - Pt is DNR/DNI Status: no DNR     Blood Consent:      - Discussed with: patient.     - Consented: consented to blood products     Postoperative Care    Pain management: non-narcotic analgesics, plan for postoperative opioid use, multimodal analgesia.     Comments:    Other Comments: MAC with propofol gtt, decadron/zofran, fentanyl/midaz PRN, preop tylenol, IV toradol at end " "of case if surgeon okay.               Yanni Grant MD    I have reviewed the pertinent notes and labs in the chart from the past 30 days and (re)examined the patient.  Any updates or changes from those notes are reflected in this note.    Clinically Significant Risk Factors Present on Admission                             # Overweight: Estimated body mass index is 28.3 kg/m  as calculated from the following:    Height as of 5/2/25: 1.689 m (5' 6.5\").    Weight as of 5/2/25: 80.7 kg (178 lb).       # Asthma: noted on problem list              "

## 2025-05-15 NOTE — ANESTHESIA CARE TRANSFER NOTE
Patient: Neisha Morton    Procedure: Procedure(s):  EXAM UNDER ANESTHESIA OF RECTUM, PARTIAL FISTULOTOMY, SETON PLACEMENT       Diagnosis: Anal fissure and fistula [K60.2, K60.30]  Diagnosis Additional Information: No value filed.    Anesthesia Type:   MAC     Note:    Oropharynx: oropharynx clear of all foreign objects and spontaneously breathing  Level of Consciousness: awake and drowsy  Oxygen Supplementation: room air    Independent Airway: airway patency satisfactory and stable  Dentition: dentition unchanged  Vital Signs Stable: post-procedure vital signs reviewed and stable  Report to RN Given: handoff report given  Patient transferred to: Phase II    Handoff Report: Identifed the Patient, Identified the Reponsible Provider, Reviewed the pertinent medical history, Discussed the surgical course, Reviewed Intra-OP anesthesia mangement and issues during anesthesia, Set expectations for post-procedure period and Allowed opportunity for questions and acknowledgement of understanding      Vitals:  Vitals Value Taken Time   /58 05/15/25 10:30   Temp 98.1  F (36.7  C) 05/15/25 10:28   Pulse 115 05/15/25 10:30   Resp 16 05/15/25 10:28   SpO2 96 % 05/15/25 10:30   Vitals shown include unfiled device data.    Electronically Signed By: JOAO Gould CRNA  May 15, 2025  10:33 AM

## 2025-05-15 NOTE — ANESTHESIA POSTPROCEDURE EVALUATION
Patient: Neisha Morton    Procedure: Procedure(s):  EXAM UNDER ANESTHESIA OF RECTUM, PARTIAL FISTULOTOMY, SETON PLACEMENT       Anesthesia Type:  MAC    Note:  Disposition: Outpatient   Postop Pain Control: Uneventful            Sign Out: Well controlled pain   PONV: No   Neuro/Psych: Uneventful            Sign Out: Acceptable/Baseline neuro status   Airway/Respiratory: Uneventful            Sign Out: Acceptable/Baseline resp. status   CV/Hemodynamics: Uneventful            Sign Out: Acceptable CV status; No obvious hypovolemia; No obvious fluid overload   Other NRE: NONE   DID A NON-ROUTINE EVENT OCCUR? No           Last vitals:  Vitals Value Taken Time   /69 05/15/25 11:30   Temp 98.1  F (36.7  C) 05/15/25 10:28   Pulse 70 05/15/25 11:33   Resp 16 05/15/25 11:00   SpO2 97 % 05/15/25 11:33   Vitals shown include unfiled device data.    Electronically Signed By: Yanni Grant MD  May 15, 2025  1:55 PM

## 2025-06-08 ENCOUNTER — HEALTH MAINTENANCE LETTER (OUTPATIENT)
Age: 55
End: 2025-06-08

## 2025-07-31 ENCOUNTER — NON-VISIT BILLABLE ENCOUNTER (OUTPATIENT)
Dept: SURGERY | Facility: CLINIC | Age: 55
End: 2025-07-31
Payer: COMMERCIAL

## 2025-07-31 NOTE — PROGRESS NOTES
[facility] OPERATIVE NOTE    DATE OF SERVICE: 7/31/2025     PROCEDURE NAME:   Rectal exam under anesthesia  Seton removal   Fistulotomy     PRE-PROCEDURE DIAGNOSIS: Anal fistula (low transsphincteric fistula)     POST-PROCEDURE DIAGNOSIS: Same     SURGEON: Ani Cantor MD     ASSISTANT: None     FINDINGS: Low transsphincteric fistula     ESTIMATED BLOOD LOSS: <1 mL     ANESTHESIA: MAC     SPECIMEN: None     INDICATIONS FOR PROCEDURE:  Neisha Morton is a 54 year old female presenting with a low transsphincteric fistula.  Given the fact that this was an anterior based fistula in a woman who had undergone prior vaginal delivery, I elected to do this as a staged fistulotomy.  She tolerated the first stage without any issues in terms of continence.  The risks and benefits of surgery were discussed with the patient including infection, abscess recurrence, need for further operative interventions, possible damage to the sphincter and incontinence (or changes in continence) and increased pain and bleeding were discussed with the patient. Alternatives were also discussed. The patient agreed to proceed with surgery and informed consent was obtained.     DESCRIPTION OF PROCEDURE:  The patient was taken to the operating room and placed under MAC anesthesia. They were then placed in the prone daya knife position on the operating room table. The buttocks were taped apart. The surgical area was then prepped and draped in the usual sterile fashion. A timeout was performed per protocol.  Examination of the perianal skin was significant for a well-healed fistulotomy incision with a seton in the remaining portion of the fistula.  Next, a terminal pudendal nerve block was performed with 40mL of a 50/50 mix of 1% lidocaine with epinephrine and 0.25% bupivicaine. A digital rectal exam was performed which revealed no abnormalities of the anal canal.  Next a lubricated Vasquez bivalve anoscope was placed into the anal  canal.  Examination of the anal canal was significant for no abnormalities of the anal canal.  The internal opening was quite distal.    Based on these findings I proceeded to treat the fistula.  The seton was cut and removed.  I placed in a fistula probe through the tract.  I did feel that there was a very small amount of residual muscle here.  I divided this using electrocautery.  The residual muscle was external sphincter.  The base of the wound was then sharply debrided using a curette.  The edges were marsupialized down using 3-0 chromic.     Hemostasis was confirmed. All sponge, needle and instrument counts were correct at the end of the procedure. The patient tolerated the procedure well and was awakened from anesthesia without difficulty, and transferred to the recovery room in stable condition.    COMPLICATIONS: None apparent    DISPOSITION: Stable to PACU    Ani Cantor MD, ABDI  [facility]  Office: [phone]  7/31/2025 11:08 AM

## 2025-09-02 DIAGNOSIS — R73.03 PREDIABETES: ICD-10-CM

## 2025-09-03 ENCOUNTER — MYC REFILL (OUTPATIENT)
Dept: FAMILY MEDICINE | Facility: CLINIC | Age: 55
End: 2025-09-03
Payer: COMMERCIAL

## 2025-09-03 DIAGNOSIS — L21.9 SEBORRHEIC DERMATITIS OF SCALP: ICD-10-CM

## 2025-09-03 DIAGNOSIS — Z91.013 SHELLFISH ALLERGY: ICD-10-CM

## 2025-09-03 DIAGNOSIS — M17.11 PRIMARY OSTEOARTHRITIS OF RIGHT KNEE: ICD-10-CM

## 2025-09-03 RX ORDER — METFORMIN HYDROCHLORIDE 500 MG/1
500 TABLET, EXTENDED RELEASE ORAL
Qty: 90 TABLET | Refills: 1 | Status: SHIPPED | OUTPATIENT
Start: 2025-09-03

## 2025-09-03 RX ORDER — CLOBETASOL PROPIONATE 0.05 G/100ML
SHAMPOO TOPICAL
Qty: 118 ML | Refills: 1 | Status: SHIPPED | OUTPATIENT
Start: 2025-09-03

## 2025-09-03 RX ORDER — NAPROXEN 500 MG/1
500 TABLET ORAL 2 TIMES DAILY PRN
Qty: 60 TABLET | Refills: 0 | Status: SHIPPED | OUTPATIENT
Start: 2025-09-03

## 2025-09-03 RX ORDER — KETOCONAZOLE 20 MG/ML
SHAMPOO, SUSPENSION TOPICAL DAILY PRN
Qty: 120 ML | Refills: 3 | Status: SHIPPED | OUTPATIENT
Start: 2025-09-03

## 2025-09-03 RX ORDER — FLUOCINOLONE ACETONIDE 0.1 MG/ML
SOLUTION TOPICAL
Qty: 90 ML | Refills: 3 | Status: SHIPPED | OUTPATIENT
Start: 2025-09-03

## 2025-09-03 RX ORDER — FLUOCINOLONE ACETONIDE 0.11 MG/ML
OIL TOPICAL
Qty: 20 ML | Refills: 3 | Status: SHIPPED | OUTPATIENT
Start: 2025-09-03

## 2025-09-03 RX ORDER — EPINEPHRINE 0.3 MG/.3ML
INJECTION SUBCUTANEOUS
Qty: 1 EACH | Refills: 1 | Status: SHIPPED | OUTPATIENT
Start: 2025-09-03